# Patient Record
Sex: MALE | Race: WHITE | NOT HISPANIC OR LATINO | Employment: OTHER | URBAN - METROPOLITAN AREA
[De-identification: names, ages, dates, MRNs, and addresses within clinical notes are randomized per-mention and may not be internally consistent; named-entity substitution may affect disease eponyms.]

---

## 2018-01-12 ENCOUNTER — OFFICE VISIT (OUTPATIENT)
Dept: URGENT CARE | Facility: CLINIC | Age: 63
End: 2018-01-12
Payer: OTHER GOVERNMENT

## 2018-01-12 VITALS
DIASTOLIC BLOOD PRESSURE: 100 MMHG | WEIGHT: 205.38 LBS | HEIGHT: 70 IN | TEMPERATURE: 97 F | OXYGEN SATURATION: 99 % | HEART RATE: 69 BPM | BODY MASS INDEX: 29.4 KG/M2 | SYSTOLIC BLOOD PRESSURE: 147 MMHG

## 2018-01-12 DIAGNOSIS — R50.9 FEVER, UNSPECIFIED FEVER CAUSE: Primary | ICD-10-CM

## 2018-01-12 DIAGNOSIS — J40 BRONCHITIS: ICD-10-CM

## 2018-01-12 LAB
CTP QC/QA: YES
FLUAV AG NPH QL: NEGATIVE
FLUBV AG NPH QL: NEGATIVE

## 2018-01-12 PROCEDURE — 96372 THER/PROPH/DIAG INJ SC/IM: CPT | Mod: S$GLB,,, | Performed by: FAMILY MEDICINE

## 2018-01-12 PROCEDURE — 87804 INFLUENZA ASSAY W/OPTIC: CPT | Mod: 59,QW,S$GLB, | Performed by: FAMILY MEDICINE

## 2018-01-12 PROCEDURE — 99203 OFFICE O/P NEW LOW 30 MIN: CPT | Mod: 25,S$GLB,, | Performed by: FAMILY MEDICINE

## 2018-01-12 RX ORDER — AZITHROMYCIN 250 MG/1
TABLET, FILM COATED ORAL
Qty: 6 TABLET | Refills: 0 | Status: SHIPPED | OUTPATIENT
Start: 2018-01-12 | End: 2018-02-15

## 2018-01-12 RX ORDER — ALBUTEROL SULFATE 90 UG/1
2 AEROSOL, METERED RESPIRATORY (INHALATION) EVERY 6 HOURS PRN
Qty: 18 G | Refills: 0 | Status: SHIPPED | OUTPATIENT
Start: 2018-01-12 | End: 2018-02-15

## 2018-01-12 RX ORDER — BENZONATATE 100 MG/1
100 CAPSULE ORAL EVERY 6 HOURS PRN
Qty: 30 CAPSULE | Refills: 1 | Status: SHIPPED | OUTPATIENT
Start: 2018-01-12 | End: 2018-02-15

## 2018-01-12 RX ORDER — IBUPROFEN 200 MG
200 TABLET ORAL 2 TIMES DAILY PRN
COMMUNITY
Start: 2017-10-28

## 2018-01-12 RX ORDER — BETAMETHASONE SODIUM PHOSPHATE AND BETAMETHASONE ACETATE 3; 3 MG/ML; MG/ML
6 INJECTION, SUSPENSION INTRA-ARTICULAR; INTRALESIONAL; INTRAMUSCULAR; SOFT TISSUE ONCE
Status: COMPLETED | OUTPATIENT
Start: 2018-01-12 | End: 2018-01-12

## 2018-01-12 RX ORDER — CHOLECALCIFEROL (VITAMIN D3) 25 MCG
TABLET ORAL
COMMUNITY
Start: 2017-10-08 | End: 2018-02-15 | Stop reason: SDUPTHER

## 2018-01-12 RX ORDER — CLOPIDOGREL BISULFATE 75 MG/1
75 TABLET ORAL DAILY
COMMUNITY
Start: 2017-10-08 | End: 2018-02-15 | Stop reason: SDUPTHER

## 2018-01-12 RX ORDER — ATORVASTATIN CALCIUM 20 MG/1
20 TABLET, FILM COATED ORAL NIGHTLY
COMMUNITY
Start: 2017-10-08 | End: 2018-02-15 | Stop reason: SDUPTHER

## 2018-01-12 RX ADMIN — BETAMETHASONE SODIUM PHOSPHATE AND BETAMETHASONE ACETATE 6 MG: 3; 3 INJECTION, SUSPENSION INTRA-ARTICULAR; INTRALESIONAL; INTRAMUSCULAR; SOFT TISSUE at 04:01

## 2018-01-12 NOTE — PROGRESS NOTES
"Subjective:       Patient ID: Erik Rodriguez is a 63 y.o. male.    Vitals:  height is 5' 10" (1.778 m) and weight is 93.2 kg (205 lb 6.4 oz). His oral temperature is 97.1 °F (36.2 °C). His blood pressure is 147/100 (abnormal) and his pulse is 69. His oxygen saturation is 99%.     Chief Complaint: Cough    Cough   This is a recurrent problem. The current episode started in the past 7 days. Associated symptoms include chills, a fever, nasal congestion, postnasal drip and rhinorrhea. Pertinent negatives include no chest pain, ear pain, eye redness, headaches, myalgias, sore throat, shortness of breath or wheezing. There is no history of asthma.     Review of Systems   Constitution: Positive for chills, fever and night sweats. Negative for malaise/fatigue.   HENT: Positive for hoarse voice, postnasal drip and rhinorrhea. Negative for congestion, ear pain and sore throat.    Eyes: Negative for discharge and redness.   Cardiovascular: Negative for chest pain, dyspnea on exertion and leg swelling.   Respiratory: Positive for cough. Negative for shortness of breath, sputum production and wheezing.    Musculoskeletal: Negative for myalgias.   Gastrointestinal: Negative for abdominal pain, diarrhea, nausea and vomiting.   Neurological: Negative for headaches.       Objective:      Physical Exam   Constitutional: He is oriented to person, place, and time. He appears well-developed and well-nourished. He is cooperative.  Non-toxic appearance. He does not appear ill. No distress.   HENT:   Head: Normocephalic and atraumatic.   Right Ear: Hearing, tympanic membrane, external ear and ear canal normal.   Left Ear: Hearing, tympanic membrane, external ear and ear canal normal.   Nose: Nose normal. No mucosal edema, rhinorrhea or nasal deformity. No epistaxis. Right sinus exhibits no maxillary sinus tenderness and no frontal sinus tenderness. Left sinus exhibits no maxillary sinus tenderness and no frontal sinus tenderness. "   Mouth/Throat: Uvula is midline, oropharynx is clear and moist and mucous membranes are normal. No trismus in the jaw. Normal dentition. No uvula swelling. No posterior oropharyngeal erythema.   Eyes: Conjunctivae and lids are normal. No scleral icterus.   Sclera clear bilat   Neck: Trachea normal, full passive range of motion without pain and phonation normal. Neck supple.   Cardiovascular: Normal rate, regular rhythm, normal heart sounds, intact distal pulses and normal pulses.    Pulmonary/Chest: Breath sounds normal. He is in respiratory distress (cough).   Abdominal: Normal appearance. He exhibits no distension. There is no tenderness.   Musculoskeletal: Normal range of motion. He exhibits no edema or deformity.   Neurological: He is alert and oriented to person, place, and time. He exhibits normal muscle tone. Coordination normal.   Skin: Skin is warm, dry and intact. He is not diaphoretic. No pallor.   Psychiatric: He has a normal mood and affect. His speech is normal and behavior is normal. Judgment and thought content normal. Cognition and memory are normal.   Nursing note and vitals reviewed.      Assessment:       1. Fever, unspecified fever cause    2. Bronchitis        Plan:         Fever, unspecified fever cause  -     POCT Influenza A/B    Bronchitis    Other orders  -     azithromycin (ZITHROMAX) 250 MG tablet; Take 2 pills on day one, then one pill each day until completed  Dispense: 6 tablet; Refill: 0  -     betamethasone acetate-betamethasone sodium phosphate injection 6 mg; Inject 1 mL (6 mg total) into the muscle once.  -     benzonatate (TESSALON PERLES) 100 MG capsule; Take 1 capsule (100 mg total) by mouth every 6 (six) hours as needed for Cough.  Dispense: 30 capsule; Refill: 1  -     albuterol 90 mcg/actuation inhaler; Inhale 2 puffs into the lungs every 6 (six) hours as needed for Wheezing. Rescue  Dispense: 18 g; Refill: 0

## 2019-01-21 PROBLEM — R07.9 CHEST PAIN: Status: ACTIVE | Noted: 2019-01-21

## 2019-01-21 PROBLEM — M54.2 NECK PAIN: Status: ACTIVE | Noted: 2019-01-21

## 2019-01-22 ENCOUNTER — OFFICE VISIT (OUTPATIENT)
Dept: CARDIOLOGY | Facility: CLINIC | Age: 64
End: 2019-01-22
Payer: MEDICARE

## 2019-01-22 VITALS
HEIGHT: 70 IN | WEIGHT: 203.69 LBS | HEART RATE: 76 BPM | DIASTOLIC BLOOD PRESSURE: 78 MMHG | SYSTOLIC BLOOD PRESSURE: 134 MMHG | BODY MASS INDEX: 29.16 KG/M2

## 2019-01-22 DIAGNOSIS — R94.31 NONSPECIFIC ABNORMAL ELECTROCARDIOGRAM (ECG) (EKG): ICD-10-CM

## 2019-01-22 DIAGNOSIS — Z00.00 ROUTINE CHECK-UP: ICD-10-CM

## 2019-01-22 DIAGNOSIS — R42 VERTIGO: ICD-10-CM

## 2019-01-22 DIAGNOSIS — E78.5 HYPERLIPIDEMIA, UNSPECIFIED HYPERLIPIDEMIA TYPE: ICD-10-CM

## 2019-01-22 DIAGNOSIS — M54.2 NECK PAIN: ICD-10-CM

## 2019-01-22 DIAGNOSIS — G45.9 TIA (TRANSIENT ISCHEMIC ATTACK): ICD-10-CM

## 2019-01-22 DIAGNOSIS — R07.9 CHEST PAIN, UNSPECIFIED TYPE: ICD-10-CM

## 2019-01-22 PROCEDURE — 99999 PR PBB SHADOW E&M-EST. PATIENT-LVL III: CPT | Mod: PBBFAC,,, | Performed by: INTERNAL MEDICINE

## 2019-01-22 PROCEDURE — 99204 OFFICE O/P NEW MOD 45 MIN: CPT | Mod: S$PBB,,, | Performed by: INTERNAL MEDICINE

## 2019-01-22 PROCEDURE — 93010 ELECTROCARDIOGRAM REPORT: CPT | Mod: S$PBB,,, | Performed by: INTERNAL MEDICINE

## 2019-01-22 PROCEDURE — 99213 OFFICE O/P EST LOW 20 MIN: CPT | Mod: PBBFAC,PO,25 | Performed by: INTERNAL MEDICINE

## 2019-01-22 PROCEDURE — 93010 EKG 12-LEAD: ICD-10-PCS | Mod: S$PBB,,, | Performed by: INTERNAL MEDICINE

## 2019-01-22 PROCEDURE — 93005 ELECTROCARDIOGRAM TRACING: CPT | Mod: PBBFAC,PO | Performed by: INTERNAL MEDICINE

## 2019-01-22 PROCEDURE — 99204 PR OFFICE/OUTPT VISIT, NEW, LEVL IV, 45-59 MIN: ICD-10-PCS | Mod: S$PBB,,, | Performed by: INTERNAL MEDICINE

## 2019-01-22 PROCEDURE — 99999 PR PBB SHADOW E&M-EST. PATIENT-LVL III: ICD-10-PCS | Mod: PBBFAC,,, | Performed by: INTERNAL MEDICINE

## 2019-01-22 NOTE — LETTER
January 22, 2019      Win Jean II, MD  [address]  [facility] [address]           [facility]  [address] [facility] [address] [place] [address]  Phone: [phone]          Patient: Erik Rodriguez   MR Number: [id number]   YOB: 1955   Date of Visit: 1/22/2019       Dear Dr. Win Jean II:    Thank you for referring Erik Rodriguez to me for evaluation. Attached you will find relevant portions of my assessment and plan of care.    If you have questions, please do not hesitate to call me. I look forward to following Erik Rodriguez along with you.    Sincerely,    Joss Craft MD    Enclosure  CC:  No Recipients    If you would like to receive this communication electronically, please contact [email or web address] or [phone] to request more information on [facility] Link access.    For providers and/or their staff who would like to refer a patient to [facility], please contact us through our one-stop-shop provider referral line, [facility] , at [phone].    If you feel you have received this communication in error or would no longer like to receive these types of communications, please e-mail [email or web address]

## 2019-01-22 NOTE — PROGRESS NOTES
Subjective:    Patient ID:  Erik Rodriguez is a 64 y.o. male who presents for evaluation of Abnormal ECG (hx TIA); Dizziness; left shoulder pain; and Hyperlipidemia      Pt woke up one morning last week with severe vertigo which lasted about 6 hours. He also had L scapular and upper chest pain along with a pain in the arm with numbness and tingling into the hand. The shoulder and arm symptoms have persisted for the past several days and aggravated by certain positions. He has palin films of the cervical spine showing degenerative changes from C3-C7. ECG showed sinus rhythm with frequent PAC's but was read as AF. He denies any palpitations, syncope or pre-syncope. He denies rapid or sustained rhythms. He denies any exertional chest pain, SOB, PND, orthopnea. He denies claudication, lower extremity edema. He denies exertional symptoms.        Review of Systems   Constitution: Negative for weight gain and weight loss.   HENT: Negative.    Eyes: Negative.    Cardiovascular: Negative for claudication, cyanosis, dyspnea on exertion, irregular heartbeat, leg swelling, near-syncope, orthopnea (no PND), palpitations and syncope.   Respiratory: Negative for cough, hemoptysis, shortness of breath and snoring.    Endocrine: Negative.    Skin: Negative.    Musculoskeletal: Positive for neck pain. Negative for joint pain, muscle cramps, muscle weakness and myalgias.   Gastrointestinal: Negative for diarrhea, hematemesis, nausea and vomiting.   Genitourinary: Negative.    Neurological: Positive for dizziness and vertigo. Negative for focal weakness, light-headedness, loss of balance, numbness, paresthesias and seizures.   Psychiatric/Behavioral: Negative.         Objective:    Physical Exam   Constitutional: He is oriented to person, place, and time. He appears well-developed and well-nourished.   HENT:   Mouth/Throat: Oropharynx is clear and moist.   Eyes: Pupils are equal, round, and reactive to light.   Neck: Normal range of  motion. No thyromegaly present.   Cardiovascular: Normal rate, regular rhythm, S1 normal, S2 normal, normal heart sounds, intact distal pulses and normal pulses.  Occasional extrasystoles are present. PMI is not displaced. Exam reveals no friction rub.   No murmur heard.  Pulmonary/Chest: Effort normal and breath sounds normal. He has no wheezes. He has no rales. He exhibits no tenderness.   Abdominal: Soft. Bowel sounds are normal. He exhibits no distension and no mass. There is no tenderness.   Musculoskeletal: Normal range of motion. He exhibits no edema.   Neurological: He is alert and oriented to person, place, and time.   Skin: Skin is warm and dry.   Vitals reviewed.      Test(s) Reviewed  I have reviewed the following in detail:  [] Stress test   [] Angiography   [] Echocardiogram   [] Labs   [x] Other:  ECG; neck X-ray       Assessment:       1. Chest pain, unspecified type    2. Nonspecific abnormal electrocardiogram (ECG) (EKG)    3. Neck pain    4. Hyperlipidemia, unspecified hyperlipidemia type    5. Vertigo    6. h/o TIA (transient ischemic attack)         Plan:       Pt's ECG from 1/21 shows frequent PAC's not atrial fibrillation.  His neck and arm symptoms are most likely due to his arthritic issues in the cervical spine and unlikely cardiac.  Given age and other risk factors it is reasonable to do screening stress and Echo  SPECT stress  Echo

## 2019-01-31 ENCOUNTER — CLINICAL SUPPORT (OUTPATIENT)
Dept: CARDIOLOGY | Facility: CLINIC | Age: 64
End: 2019-01-31
Attending: INTERNAL MEDICINE
Payer: MEDICARE

## 2019-01-31 ENCOUNTER — HOSPITAL ENCOUNTER (OUTPATIENT)
Dept: RADIOLOGY | Facility: HOSPITAL | Age: 64
Discharge: HOME | End: 2019-01-31
Attending: INTERNAL MEDICINE
Payer: MEDICARE

## 2019-01-31 VITALS
BODY MASS INDEX: 29.06 KG/M2 | DIASTOLIC BLOOD PRESSURE: 76 MMHG | HEART RATE: 68 BPM | HEIGHT: 70 IN | SYSTOLIC BLOOD PRESSURE: 118 MMHG | WEIGHT: 203 LBS

## 2019-01-31 DIAGNOSIS — G45.9 TIA (TRANSIENT ISCHEMIC ATTACK): ICD-10-CM

## 2019-01-31 DIAGNOSIS — R42 VERTIGO: ICD-10-CM

## 2019-01-31 DIAGNOSIS — M54.2 NECK PAIN: ICD-10-CM

## 2019-01-31 DIAGNOSIS — R94.31 NONSPECIFIC ABNORMAL ELECTROCARDIOGRAM (ECG) (EKG): ICD-10-CM

## 2019-01-31 DIAGNOSIS — E78.5 HYPERLIPIDEMIA, UNSPECIFIED HYPERLIPIDEMIA TYPE: ICD-10-CM

## 2019-01-31 DIAGNOSIS — R07.9 CHEST PAIN, UNSPECIFIED TYPE: ICD-10-CM

## 2019-01-31 DIAGNOSIS — Z00.00 ROUTINE CHECK-UP: Primary | ICD-10-CM

## 2019-01-31 LAB
ASCENDING AORTA: 2.92 CM
AV INDEX (PROSTH): 0.58
AV MEAN GRADIENT: 4.4 MMHG
AV PEAK GRADIENT: 7.95 MMHG
AV VALVE AREA: 2.02 CM2
AV VELOCITY RATIO: 0.61
BSA FOR ECHO PROCEDURE: 2.13 M2
CV ECHO LV RWT: 0.34 CM
CV STRESS BASE HR: 70
DIASTOLIC BLOOD PRESSURE: 93
DOP CALC AO PEAK VEL: 1.41 M/S
DOP CALC AO VTI: 34.55 CM
DOP CALC LVOT AREA: 3.49 CM2
DOP CALC LVOT DIAMETER: 2.11 CM
DOP CALC LVOT PEAK VEL: 0.87 M/S
DOP CALC LVOT STROKE VOLUME: 69.72 CM3
DOP CALCLVOT PEAK VEL VTI: 19.95 CM
E WAVE DECELERATION TIME: 199.8 MSEC
E/A RATIO: 1.01
E/E' RATIO: 8.33
ECHO LV POSTERIOR WALL: 0.85 CM (ref 0.6–1.1)
FRACTIONAL SHORTENING: 39 % (ref 28–44)
INTERVENTRICULAR SEPTUM: 0.88 CM (ref 0.6–1.1)
IVRT: 0.12 MSEC
LA MAJOR: 5.22 CM
LA MINOR: 5.2 CM
LA WIDTH: 4.04 CM
LEFT ATRIUM SIZE: 3.48 CM
LEFT ATRIUM VOLUME INDEX: 29.6 ML/M2
LEFT ATRIUM VOLUME: 62.26 CM3
LEFT INTERNAL DIMENSION IN SYSTOLE: 3.02 CM (ref 2.1–4)
LEFT VENTRICLE DIASTOLIC VOLUME INDEX: 54.52 ML/M2
LEFT VENTRICLE DIASTOLIC VOLUME: 114.52 ML
LEFT VENTRICLE MASS INDEX: 69.8 G/M2
LEFT VENTRICLE SYSTOLIC VOLUME INDEX: 16.9 ML/M2
LEFT VENTRICLE SYSTOLIC VOLUME: 35.58 ML
LEFT VENTRICULAR INTERNAL DIMENSION IN DIASTOLE: 4.93 CM (ref 3.5–6)
LEFT VENTRICULAR MASS: 146.69 G
LV LATERAL E/E' RATIO: 6.25
LV SEPTAL E/E' RATIO: 12.5
MV PEAK A VEL: 0.74 M/S
MV PEAK E VEL: 0.75 M/S
NUC REST EJECTION FRACTION: 64
OHS CV CPX 1 MINUTE RECOVERY HEART RATE: 110 BPM
OHS CV CPX 85 PERCENT MAX PREDICTED HEART RATE MALE: 132
OHS CV CPX ESTIMATED METS: 11
OHS CV CPX MAX PREDICTED HEART RATE: 155
OHS CV CPX PATIENT IS FEMALE: 0
OHS CV CPX PATIENT IS MALE: 1
OHS CV CPX PEAK DIASTOLIC BLOOD PRESSURE: 101 MMHG
OHS CV CPX PEAK HEAR RATE: 148
OHS CV CPX PEAK RATE PRESSURE PRODUCT: NORMAL
OHS CV CPX PEAK SYSTOLIC BLOOD PRESSURE: 155
OHS CV CPX PERCENT MAX PREDICTED HEART RATE ACHIEVED: 95
OHS CV CPX RATE PRESSURE PRODUCT PRESENTING: 9170
PISA TR MAX VEL: 2.27 M/S
PULM VEIN S/D RATIO: 1.42
PV PEAK D VEL: 0.53 M/S
PV PEAK S VEL: 0.75 M/S
RA MAJOR: 4.87 CM
RA PRESSURE: 3 MMHG
RA WIDTH: 3.84 CM
RIGHT VENTRICULAR END-DIASTOLIC DIMENSION: 3.9 CM
SINUS: 3.17 CM
STJ: 2.7 CM
STRESS ECHO POST EXERCISE DUR MIN: 6 MIN
STRESS ECHO POST EXERCISE DUR SEC: 49
SYSTOLIC BLOOD PRESSURE: 131
TDI LATERAL: 0.12
TDI SEPTAL: 0.06
TDI: 0.09
TR MAX PG: 20.61 MMHG
TRICUSPID ANNULAR PLANE SYSTOLIC EXCURSION: 2.6 CM
TV REST PULMONARY ARTERY PRESSURE: 24 MMHG

## 2019-01-31 PROCEDURE — 93306 TRANSTHORACIC ECHO (TTE) COMPLETE (CUPID ONLY): ICD-10-PCS | Mod: 26,S$PBB,, | Performed by: INTERNAL MEDICINE

## 2019-01-31 PROCEDURE — 93016 STRESS TEST WITH MYOCARDIAL PERFUSION (CUPID ONLY): ICD-10-PCS | Mod: ,,, | Performed by: INTERNAL MEDICINE

## 2019-01-31 PROCEDURE — 99999 PR PBB SHADOW E&M-EST. PATIENT-LVL II: ICD-10-PCS | Mod: PBBFAC,,,

## 2019-01-31 PROCEDURE — A9502 TC99M TETROFOSMIN: HCPCS | Mod: PO

## 2019-01-31 PROCEDURE — 93016 CV STRESS TEST SUPVJ ONLY: CPT | Mod: ,,, | Performed by: INTERNAL MEDICINE

## 2019-01-31 PROCEDURE — 93306 TTE W/DOPPLER COMPLETE: CPT | Mod: PBBFAC,PO | Performed by: INTERNAL MEDICINE

## 2019-01-31 PROCEDURE — 78452 STRESS TEST WITH MYOCARDIAL PERFUSION (CUPID ONLY): ICD-10-PCS | Mod: 26,,, | Performed by: INTERNAL MEDICINE

## 2019-01-31 PROCEDURE — 93018 CV STRESS TEST I&R ONLY: CPT | Mod: ,,, | Performed by: INTERNAL MEDICINE

## 2019-01-31 PROCEDURE — 99999 PR PBB SHADOW E&M-EST. PATIENT-LVL II: CPT | Mod: PBBFAC,,,

## 2019-01-31 PROCEDURE — 93018 PR CARDIAC STRESS TST,INTERP/REPT ONLY: ICD-10-PCS | Mod: ,,, | Performed by: INTERNAL MEDICINE

## 2019-01-31 PROCEDURE — 78452 HT MUSCLE IMAGE SPECT MULT: CPT | Mod: 26,,, | Performed by: INTERNAL MEDICINE

## 2019-01-31 PROCEDURE — 99212 OFFICE O/P EST SF 10 MIN: CPT | Mod: PBBFAC,25,PO

## 2019-02-04 ENCOUNTER — TELEPHONE (OUTPATIENT)
Dept: CARDIOLOGY | Facility: CLINIC | Age: 64
End: 2019-02-04

## 2019-02-05 ENCOUNTER — PATIENT MESSAGE (OUTPATIENT)
Dept: ADMINISTRATIVE | Facility: OTHER | Age: 64
End: 2019-02-05

## 2019-02-05 ENCOUNTER — TELEPHONE (OUTPATIENT)
Dept: CARDIOLOGY | Facility: CLINIC | Age: 64
End: 2019-02-05

## 2019-02-05 NOTE — TELEPHONE ENCOUNTER
----- Message from Rochelle Mack sent at 2/5/2019  4:50 PM CST -----   Type:  Test Results    Who Called:  pt  Name of Test (Lab/Mammo/Etc): stress  test  Date of Test:    0131   Where the test was performed:  [facility]  Best Call Back Number:[phone]  Additional Information:  placed a call to pod //  Pt  Has  Some  Questions  About     Stress  Test  Results

## 2019-02-18 PROBLEM — M50.90 CERVICAL DISC DISEASE: Status: ACTIVE | Noted: 2019-02-18

## 2019-04-25 ENCOUNTER — OFFICE VISIT (OUTPATIENT)
Dept: URGENT CARE | Facility: CLINIC | Age: 64
End: 2019-04-25
Payer: MEDICARE

## 2019-04-25 VITALS
HEART RATE: 73 BPM | DIASTOLIC BLOOD PRESSURE: 82 MMHG | WEIGHT: 199 LBS | BODY MASS INDEX: 28.49 KG/M2 | TEMPERATURE: 98 F | HEIGHT: 70 IN | RESPIRATION RATE: 12 BRPM | OXYGEN SATURATION: 98 % | SYSTOLIC BLOOD PRESSURE: 127 MMHG

## 2019-04-25 DIAGNOSIS — R09.81 SINUS CONGESTION: Primary | ICD-10-CM

## 2019-04-25 PROCEDURE — 99214 PR OFFICE/OUTPT VISIT, EST, LEVL IV, 30-39 MIN: ICD-10-PCS | Mod: S$GLB,,, | Performed by: PHYSICIAN ASSISTANT

## 2019-04-25 PROCEDURE — 99214 OFFICE O/P EST MOD 30 MIN: CPT | Mod: S$GLB,,, | Performed by: PHYSICIAN ASSISTANT

## 2019-04-25 RX ORDER — OLOPATADINE HYDROCHLORIDE 1 MG/ML
1 SOLUTION/ DROPS OPHTHALMIC 2 TIMES DAILY
Qty: 5 ML | Refills: 2 | Status: SHIPPED | OUTPATIENT
Start: 2019-04-25 | End: 2019-08-14 | Stop reason: SDUPTHER

## 2019-04-25 RX ORDER — METHYLPREDNISOLONE 4 MG/1
TABLET ORAL
Qty: 1 PACKAGE | Refills: 0 | Status: SHIPPED | OUTPATIENT
Start: 2019-04-25 | End: 2019-08-14 | Stop reason: ALTCHOICE

## 2019-04-25 RX ORDER — FLUTICASONE PROPIONATE 50 MCG
1 SPRAY, SUSPENSION (ML) NASAL 2 TIMES DAILY PRN
Qty: 1 BOTTLE | Refills: 1 | Status: SHIPPED | OUTPATIENT
Start: 2019-04-25 | End: 2020-03-02 | Stop reason: SDUPTHER

## 2019-04-25 NOTE — PROGRESS NOTES
"Subjective:       Patient ID: Erik Rodriguez is a 64 y.o. male.    Vitals:  height is 5' 10" (1.778 m) and weight is 90.3 kg (199 lb). His oral temperature is 97.7 °F (36.5 °C). His blood pressure is 127/82 and his pulse is 73. His respiration is 12 and oxygen saturation is 98%.     Chief Complaint: Sinus Problem    Pt c/o headaces, sinus pressure, itchy eyes, watery eyes, and nasal congestion, x 15 days     Sinus Problem   This is a new problem. The current episode started 1 to 4 weeks ago. The problem has been gradually worsening since onset. There has been no fever. His pain is at a severity of 3/10. The pain is mild. Associated symptoms include congestion (nasal congestion ), headaches and sinus pressure. Pertinent negatives include no chills, diaphoresis, ear pain, shortness of breath or sore throat. Past treatments include saline sprays.       Constitution: Negative for chills, sweating, fatigue and fever.   HENT: Positive for congestion (nasal congestion ) and sinus pressure. Negative for ear pain, sinus pain, sore throat and voice change.    Neck: Negative for painful lymph nodes.   Eyes: Positive for eye itching and eye redness.   Respiratory: Negative for chest tightness, sputum production, bloody sputum, COPD, shortness of breath, stridor, wheezing and asthma.    Gastrointestinal: Negative for nausea and vomiting.   Musculoskeletal: Negative for muscle ache.   Skin: Negative for rash.   Allergic/Immunologic: Negative for seasonal allergies and asthma.   Neurological: Positive for headaches.   Hematologic/Lymphatic: Negative for swollen lymph nodes.       Objective:      Physical Exam   Constitutional: He is oriented to person, place, and time. He appears well-developed and well-nourished. He is cooperative.  Non-toxic appearance. He does not appear ill. No distress.   HENT:   Head: Normocephalic and atraumatic.   Right Ear: Hearing, tympanic membrane, external ear and ear canal normal.   Left Ear: Hearing, " tympanic membrane, external ear and ear canal normal.   Nose: Nose normal. No mucosal edema, rhinorrhea or nasal deformity. No epistaxis. Right sinus exhibits no maxillary sinus tenderness and no frontal sinus tenderness. Left sinus exhibits no maxillary sinus tenderness and no frontal sinus tenderness.   Mouth/Throat: Uvula is midline, oropharynx is clear and moist and mucous membranes are normal. No trismus in the jaw. Normal dentition. No uvula swelling. No posterior oropharyngeal erythema.   Eyes: Conjunctivae and lids are normal. No scleral icterus.   Sclera clear bilat   Neck: Trachea normal, full passive range of motion without pain and phonation normal. Neck supple.   Cardiovascular: Normal rate, regular rhythm, normal heart sounds, intact distal pulses and normal pulses.   Pulmonary/Chest: Effort normal and breath sounds normal. No respiratory distress.   Abdominal: Soft. Normal appearance and bowel sounds are normal. He exhibits no distension. There is no tenderness.   Musculoskeletal: Normal range of motion. He exhibits no edema or deformity.   Neurological: He is alert and oriented to person, place, and time. He exhibits normal muscle tone. Coordination normal.   Skin: Skin is warm, dry and intact. He is not diaphoretic. No pallor.   Psychiatric: He has a normal mood and affect. His speech is normal and behavior is normal. Judgment and thought content normal. Cognition and memory are normal.   Nursing note and vitals reviewed.      Assessment:       1. Sinus congestion        Plan:         Sinus congestion  -     olopatadine (PATANOL) 0.1 % ophthalmic solution; Place 1 drop into both eyes 2 (two) times daily.  Dispense: 5 mL; Refill: 2  -     methylPREDNISolone (MEDROL DOSEPACK) 4 mg tablet; use as directed on the box  Dispense: 1 Package; Refill: 0  -     fluticasone (FLONASE) 50 mcg/actuation nasal spray; 1 spray (50 mcg total) by Each Nare route 2 (two) times daily as needed for Rhinitis or Allergies.  " Dispense: 1 Bottle; Refill: 1  -     sodium chloride (OCEAN NASAL) 0.65 % nasal spray; 1 spray by Nasal route as needed for Congestion.  Dispense: 45 mL; Refill: 3    RTC if symptoms persist or worsen.    Patient Instructions   NASAL ALLERGY    Nasal Allergy, also called "Allergic Rhinitis" occurs after exposure to pollen, molds, mildew, animal "dander" (scales from animal skin, hair and feathers), dust, smoke and fumes. (These are called "allergens"). When pollen causes a nasal allergy it is commonly called "Hay Fever".    When these particles contact the lining of the nose, eyes, eyelids, sinuses or throat, they cause the cells to release a chemical called "histamine". Histamine may cause a watery discharge from the eyes or nose. It may also cause violent sneezing, nasal congestion, itching of the eyes, nose, throat and mouth.    PREVENTION:    Nasal allergy cannot be cured but symptoms can be reduced. Avoid or reduce exposure to the allergen when possible.    HOME CARE:    1) DECONGESTANT pills and sprays (Sudafed, NeoSynephrine), reduce tissue swelling and watery discharge. Overuse of nasal decongestant sprays may make symptoms worse, ESPECIALLY IF YOU HAVE HIGH BLOOD PRESSURE. Do not use these more often than recommended. Get an over the counter Nasal Saline spray to supplement Flonase    2) ANTIHISTAMINES block the release of histamine during the allergic response. Antihistamines are more effective when taken BEFORE symptoms develop. Unless a prescription antihistamine was prescribed, you may take CLARITIN (loratadine). (Claritin is an over-the-counter antihistamine that does not cause drowsiness.)    3) STEROID nasal sprays (Beconase, Vancenase, Nasalide, Nasocort, Flonase) or oral steroids (Prednisone) may also be prescribed for more severe symptoms. These help to reduce the local inflammation which adds to the allergic response.    4) If you have ASTHMA, pollen season may make your asthma symptoms worse. " It is important that you use your asthma medicines as directed during this time to prevent or treat attacks. Some persons with asthma have a worsening of their asthma symptoms when taking antihistamines. If you notice this, stop the antihistamines and notify your doctor.    5) Consider a Chitto Rhino Nasal and Sinus Rinse 2-3 times/week if your symptoms are chronic. (https://chitorhino.com)       If not allergic,take tylenol (acetominophen) for fever control, chills, or body aches every 4 hours. Do not exceed 4000 mg/ day.If not allergic, take Motrin (Ibuprofen) every 4 hours for fever, chills, pain or inflammation. Do not exceed 2400 mg/day. You can alternate taking tylenol and motrin.  If you were prescribed a narcotic medication, do not drive or operate heavy equipment or machinery while taking these medications.  You must understand that you've received an Urgent Care treatment only and that you may be released before all your medical problems are known or treated. You, the patient, will arrange for follow up care as instructed.  Follow up with your PCP or specialty clinic as directed in the next 1-2 weeks if not improved or as needed.  You can call [phone] to schedule an appointment with the appropriate provider.  If your condition worsens we recommend that you receive another evaluation at the emergency room immediately or contact your primary medical clinics after hours call service to discuss your concerns.  Please return here or go to the Emergency Department for any concerns or worsening of condition.

## 2019-04-25 NOTE — PATIENT INSTRUCTIONS
"NASAL ALLERGY    Nasal Allergy, also called "Allergic Rhinitis" occurs after exposure to pollen, molds, mildew, animal "dander" (scales from animal skin, hair and feathers), dust, smoke and fumes. (These are called "allergens"). When pollen causes a nasal allergy it is commonly called "Hay Fever".    When these particles contact the lining of the nose, eyes, eyelids, sinuses or throat, they cause the cells to release a chemical called "histamine". Histamine may cause a watery discharge from the eyes or nose. It may also cause violent sneezing, nasal congestion, itching of the eyes, nose, throat and mouth.    PREVENTION:    Nasal allergy cannot be cured but symptoms can be reduced. Avoid or reduce exposure to the allergen when possible.    HOME CARE:    1) DECONGESTANT pills and sprays (Sudafed, NeoSynephrine), reduce tissue swelling and watery discharge. Overuse of nasal decongestant sprays may make symptoms worse, ESPECIALLY IF YOU HAVE HIGH BLOOD PRESSURE. Do not use these more often than recommended. Get an over the counter Nasal Saline spray to supplement Flonase    2) ANTIHISTAMINES block the release of histamine during the allergic response. Antihistamines are more effective when taken BEFORE symptoms develop. Unless a prescription antihistamine was prescribed, you may take CLARITIN (loratadine). (Claritin is an over-the-counter antihistamine that does not cause drowsiness.)    3) STEROID nasal sprays (Beconase, Vancenase, Nasalide, Nasocort, Flonase) or oral steroids (Prednisone) may also be prescribed for more severe symptoms. These help to reduce the local inflammation which adds to the allergic response.    4) If you have ASTHMA, pollen season may make your asthma symptoms worse. It is important that you use your asthma medicines as directed during this time to prevent or treat attacks. Some persons with asthma have a worsening of their asthma symptoms when taking antihistamines. If you notice this, stop " the antihistamines and notify your doctor.    5) Consider a Chitto Rhino Nasal and Sinus Rinse 2-3 times/week if your symptoms are chronic. ([email or web address]://chitorhino.[email or web address])       If not allergic,take tylenol (acetominophen) for fever control, chills, or body aches every 4 hours. Do not exceed 4000 mg/ day.If not allergic, take Motrin (Ibuprofen) every 4 hours for fever, chills, pain or inflammation. Do not exceed 2400 mg/day. You can alternate taking tylenol and motrin.  If you were prescribed a narcotic medication, do not drive or operate heavy equipment or machinery while taking these medications.  You must understand that you've received an Urgent Care treatment only and that you may be released before all your medical problems are known or treated. You, the patient, will arrange for follow up care as instructed.  Follow up with your PCP or specialty clinic as directed in the next 1-2 weeks if not improved or as needed.  You can call [phone] to schedule an appointment with the appropriate provider.  If your condition worsens we recommend that you receive another evaluation at the emergency room immediately or contact your primary medical clinics after hours call service to discuss your concerns.  Please return here or go to the Emergency Department for any concerns or worsening of condition.

## 2019-08-14 PROBLEM — H10.13 ALLERGIC CONJUNCTIVITIS OF BOTH EYES: Status: ACTIVE | Noted: 2019-08-14

## 2019-08-14 PROBLEM — R09.81 SINUS CONGESTION: Status: ACTIVE | Noted: 2019-08-14

## 2019-08-14 PROBLEM — Z23 NEED FOR 23-POLYVALENT PNEUMOCOCCAL POLYSACCHARIDE VACCINE: Status: RESOLVED | Noted: 2019-08-14 | Resolved: 2019-08-14

## 2019-08-14 PROBLEM — Z23 NEED FOR 23-POLYVALENT PNEUMOCOCCAL POLYSACCHARIDE VACCINE: Status: ACTIVE | Noted: 2019-08-14

## 2019-08-14 PROBLEM — J30.1 SEASONAL ALLERGIC RHINITIS DUE TO POLLEN: Status: ACTIVE | Noted: 2019-08-14

## 2019-09-16 ENCOUNTER — OFFICE VISIT (OUTPATIENT)
Dept: DERMATOLOGY | Facility: CLINIC | Age: 64
End: 2019-09-16
Payer: MEDICARE

## 2019-09-16 VITALS — WEIGHT: 194.44 LBS | HEIGHT: 70 IN | BODY MASS INDEX: 27.84 KG/M2 | RESPIRATION RATE: 18 BRPM

## 2019-09-16 DIAGNOSIS — L82.0 INFLAMED SEBORRHEIC KERATOSIS: Primary | ICD-10-CM

## 2019-09-16 DIAGNOSIS — L98.9 SKIN LESION: ICD-10-CM

## 2019-09-16 PROCEDURE — 99999 PR PBB SHADOW E&M-EST. PATIENT-LVL II: ICD-10-PCS | Mod: PBBFAC,,, | Performed by: DERMATOLOGY

## 2019-09-16 PROCEDURE — 17110 PR DESTRUCTION BENIGN LESIONS UP TO 14: ICD-10-PCS | Mod: S$PBB,,, | Performed by: DERMATOLOGY

## 2019-09-16 PROCEDURE — 99212 OFFICE O/P EST SF 10 MIN: CPT | Mod: PBBFAC,PO,25 | Performed by: DERMATOLOGY

## 2019-09-16 PROCEDURE — 99202 OFFICE O/P NEW SF 15 MIN: CPT | Mod: 25,S$PBB,, | Performed by: DERMATOLOGY

## 2019-09-16 PROCEDURE — 99999 PR PBB SHADOW E&M-EST. PATIENT-LVL II: CPT | Mod: PBBFAC,,, | Performed by: DERMATOLOGY

## 2019-09-16 PROCEDURE — 99202 PR OFFICE/OUTPT VISIT, NEW, LEVL II, 15-29 MIN: ICD-10-PCS | Mod: 25,S$PBB,, | Performed by: DERMATOLOGY

## 2019-09-16 PROCEDURE — 17110 DESTRUCTION B9 LES UP TO 14: CPT | Mod: PBBFAC,PO | Performed by: DERMATOLOGY

## 2019-09-16 PROCEDURE — 17110 DESTRUCTION B9 LES UP TO 14: CPT | Mod: S$PBB,,, | Performed by: DERMATOLOGY

## 2019-09-16 NOTE — PROGRESS NOTES
Subjective:       Patient ID:  Erik Rodriguez is a 64 y.o. male who presents for   Chief Complaint   Patient presents with    Lesion    Skin Discoloration     Present for initial visit.  C/o lesion to R cheek x 6 years. The patient denies any change, including change in color, increase in size, or spontaneous bleeding, associated with this lesion.  Not currently treating- states he has applied baby lotion in past with no improvement    Red discoloration to nasal tip- worse with sun exposure    No phx of NMSC  No fhx of melanoma    Past Medical History:  No date: Allergy  No date: Hyperlipidemia  No date: Stroke  07/2010: TIA (transient ischemic attack)        Review of Systems   Constitutional: Negative for fever and chills.   HENT: Negative for sore throat.    Respiratory: Negative for cough.    Gastrointestinal: Negative for nausea.   Skin: Positive for activity-related sunscreen use and wears hat. Negative for itching, rash, dry skin and daily sunscreen use.   Hematologic/Lymphatic: Does not bruise/bleed easily.        Objective:    Physical Exam   Constitutional: He appears well-developed and well-nourished. No distress.   Neurological: He is alert and oriented to person, place, and time. He is not disoriented.   Psychiatric: He has a normal mood and affect.   Skin:   Areas Examined (abnormalities noted in diagram):   Scalp / Hair Palpated and Inspected  Head / Face Inspection Performed  Neck Inspection Performed              Diagram Legend     Erythematous scaling macule/papule c/w actinic keratosis       Vascular papule c/w angioma      Pigmented verrucoid papule/plaque c/w seborrheic keratosis      Yellow umbilicated papule c/w sebaceous hyperplasia      Irregularly shaped tan macule c/w lentigo     1-2 mm smooth white papules consistent with Milia      Movable subcutaneous cyst with punctum c/w epidermal inclusion cyst      Subcutaneous movable cyst c/w pilar cyst      Firm pink to brown papule c/w  dermatofibroma      Pedunculated fleshy papule(s) c/w skin tag(s)      Evenly pigmented macule c/w junctional nevus     Mildly variegated pigmented, slightly irregular-bordered macule c/w mildly atypical nevus      Flesh colored to evenly pigmented papule c/w intradermal nevus       Pink pearly papule/plaque c/w basal cell carcinoma      Erythematous hyperkeratotic cursted plaque c/w SCC      Surgical scar with no sign of skin cancer recurrence      Open and closed comedones      Inflammatory papules and pustules      Verrucoid papule consistent consistent with wart     Erythematous eczematous patches and plaques     Dystrophic onycholytic nail with subungual debris c/w onychomycosis     Umbilicated papule    Erythematous-base heme-crusted tan verrucoid plaque consistent with inflamed seborrheic keratosis     Erythematous Silvery Scaling Plaque c/w Psoriasis     See annotation      Assessment / Plan:        Inflamed seborrheic keratosis  Procedure note for destruction via hyfrecation and curettage:    Verbal consent obtained. Risks of recurrence and scarring discussed.   1 lesions cleaned with alcohol and anesthetized with 1% lidocaine with epinephrine. Areas then lightly hyfrecated and curetted to remove gross lesion. Hemostasis achieved with aluminum chloride application. No complications.   Areas dressed with Vaseline jelly and bandage. Wound care discussed.    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              Follow up for skin check, at pts convenience.

## 2020-02-20 ENCOUNTER — OFFICE VISIT (OUTPATIENT)
Dept: URGENT CARE | Facility: CLINIC | Age: 65
End: 2020-02-20
Payer: MEDICARE

## 2020-02-20 VITALS
HEART RATE: 73 BPM | BODY MASS INDEX: 27.77 KG/M2 | RESPIRATION RATE: 16 BRPM | WEIGHT: 194 LBS | SYSTOLIC BLOOD PRESSURE: 125 MMHG | OXYGEN SATURATION: 98 % | TEMPERATURE: 101 F | DIASTOLIC BLOOD PRESSURE: 75 MMHG | HEIGHT: 70 IN

## 2020-02-20 DIAGNOSIS — J01.90 ACUTE BACTERIAL SINUSITIS: Primary | ICD-10-CM

## 2020-02-20 DIAGNOSIS — R50.9 FEVER, UNSPECIFIED FEVER CAUSE: ICD-10-CM

## 2020-02-20 DIAGNOSIS — B96.89 ACUTE BACTERIAL SINUSITIS: Primary | ICD-10-CM

## 2020-02-20 LAB
CTP QC/QA: YES
FLUAV AG NPH QL: NEGATIVE
FLUBV AG NPH QL: NEGATIVE

## 2020-02-20 PROCEDURE — 99214 PR OFFICE/OUTPT VISIT, EST, LEVL IV, 30-39 MIN: ICD-10-PCS | Mod: 25,S$GLB,, | Performed by: EMERGENCY MEDICINE

## 2020-02-20 PROCEDURE — 99214 OFFICE O/P EST MOD 30 MIN: CPT | Mod: 25,S$GLB,, | Performed by: EMERGENCY MEDICINE

## 2020-02-20 PROCEDURE — 87804 INFLUENZA ASSAY W/OPTIC: CPT | Mod: QW,S$GLB,, | Performed by: EMERGENCY MEDICINE

## 2020-02-20 PROCEDURE — 96372 THER/PROPH/DIAG INJ SC/IM: CPT | Mod: S$GLB,,, | Performed by: EMERGENCY MEDICINE

## 2020-02-20 PROCEDURE — 87804 POCT INFLUENZA A/B: ICD-10-PCS | Mod: QW,S$GLB,, | Performed by: EMERGENCY MEDICINE

## 2020-02-20 PROCEDURE — 96372 PR INJECTION,THERAP/PROPH/DIAG2ST, IM OR SUBCUT: ICD-10-PCS | Mod: S$GLB,,, | Performed by: EMERGENCY MEDICINE

## 2020-02-20 RX ORDER — BETAMETHASONE SODIUM PHOSPHATE AND BETAMETHASONE ACETATE 3; 3 MG/ML; MG/ML
9 INJECTION, SUSPENSION INTRA-ARTICULAR; INTRALESIONAL; INTRAMUSCULAR; SOFT TISSUE
Status: COMPLETED | OUTPATIENT
Start: 2020-02-20 | End: 2020-02-20

## 2020-02-20 RX ORDER — IBUPROFEN 200 MG
600 TABLET ORAL
Status: COMPLETED | OUTPATIENT
Start: 2020-02-20 | End: 2020-02-20

## 2020-02-20 RX ORDER — AMOXICILLIN AND CLAVULANATE POTASSIUM 875; 125 MG/1; MG/1
1 TABLET, FILM COATED ORAL EVERY 12 HOURS
Qty: 20 TABLET | Refills: 0 | Status: SHIPPED | OUTPATIENT
Start: 2020-02-20 | End: 2020-03-02 | Stop reason: ALTCHOICE

## 2020-02-20 RX ADMIN — BETAMETHASONE SODIUM PHOSPHATE AND BETAMETHASONE ACETATE 9 MG: 3; 3 INJECTION, SUSPENSION INTRA-ARTICULAR; INTRALESIONAL; INTRAMUSCULAR; SOFT TISSUE at 11:02

## 2020-02-20 RX ADMIN — Medication 600 MG: at 11:02

## 2020-02-20 NOTE — PATIENT INSTRUCTIONS
Rest and hydrate with plenty of fluids  Flu swab negative  Celestone shot 1.5 cc given in clinic  Augmentin prescription  Over-the-counter nasal spray like saline or Flonase  Over-the-counter Zyrtec or Claritin or Allegra  Over-the-counter Mucinex DM twice daily for cough  Ibuprofen 600 mg every 6 hr for fever or body aches or headache  Review sinusitis sheet      Acute Bacterial Rhinosinusitis (ABRS)    Acute bacterial rhinosinusitis (ABRS) is an infection of your nasal cavity and sinuses. Its caused by bacteria. Acute means that youve had symptoms for less than 12 weeks.  Understanding your sinuses  The nasal cavity is the large air-filled space behind your nose. The sinuses are a group of spaces formed by the bones of your face. They connect with your nasal cavity. ABRS causes the tissue lining these spaces to become inflamed. Mucus may not drain normally. This leads to facial pain and other symptoms.  What causes ABRS?  ABRS most often follows an upper respiratory infection caused by a virus. Bacteria then infect the lining of your nasal cavity and sinuses. But you can also get ABRS if you have:  · Nasal allergies  · Long-term nasal swelling and congestion not caused by allergies  · Blockage in the nose  Symptoms of ABRS  The symptoms of ABRS may be different for each person, and can include:  · Nasal congestion  · Runny nose  · Fluid draining from the nose down the throat (postnasal drip)  · Headache  · Cough  · Pain in the sinuses  · Thick, colored fluid from the nose (mucus)  · Fever  Diagnosing ABRS  ABRS may be diagnosed if youve had an upper respiratory infection like a cold and cough for longer than 10 to 14 days. Your health care provider will ask about your symptoms and your medical history. The provider will check your vital signs, including your temperature. Youll have a physical exam. The health care provider will check your ears, nose, and throat. You likely wont need any tests. If ABRS comes  back, you may have a culture or other tests.  Treatment for ABRS  Treatment may include:  · Antibiotic medicine. This is for symptoms that last for at least 10 to 14 days.  · Nasal corticosteroid medicine. Drops or spray used in the nose can lessen swelling and congestion.  · Over-the-counter pain medicine. This is to lessen sinus pain and pressure.  · Nasal decongestant medicine. Spray or drops may help to lessen congestion. Do not use them for more than a few days.  · Salt wash (saline irrigation). This can help to loosen mucus.  Possible complications of ABRS  ABRS may come back or become long-term (chronic).  In rare cases, ABRS may cause complications such as:   · Inflamed tissue around the brain and spinal cord (meningitis)  · Inflamed tissue around the eyes (orbital cellulitis)  · Inflamed bones around the sinuses (osteitis)  These problems may need to be treated in a hospital with intravenous (IV) antibiotic medicine or surgery.  When to call the health care provider  Call your health care provider if you have any of the following:  · Symptoms that dont get better, or get worse  · Symptoms that dont get better after 3 to 5 days on antibiotics  · Trouble seeing  · Swelling around your eyes  · Confusion or trouble staying awake   Date Last Reviewed: 3/3/2015  © 8612-1855 [employer]. [address] [place] [address]. All rights reserved. This information is not intended as a substitute for professional medical care. Always follow your healthcare professional's instructions.        Sinusitis (Antibiotic Treatment)    The sinuses are air-filled spaces within the bones of the face. They connect to the inside of the nose. Sinusitis is an inflammation of the tissue lining the sinus cavity. Sinus inflammation can occur during a cold. It can also be due to allergies to pollens and other particles in the air. Sinusitis can cause symptoms of sinus congestion and fullness. A sinus infection  causes fever, headache and facial pain. There is often green or yellow drainage from the nose or into the back of the throat (post-nasal drip). You have been given antibiotics to treat this condition.  Home care:  · Take the full course of antibiotics as instructed. Do not stop taking them, even if you feel better.  · Drink plenty of water, hot tea, and other liquids. This may help thin mucus. It also may promote sinus drainage.  · Heat may help soothe painful areas of the face. Use a towel soaked in hot water. Or,  the shower and direct the hot spray onto your face. Using a vaporizer along with a menthol rub at night may also help.   · An expectorant containing guaifenesin may help thin the mucus and promote drainage from the sinuses.  · Over-the-counter decongestants may be used unless a similar medicine was prescribed. Nasal sprays work the fastest. Use one that contains phenylephrine or oxymetazoline. First blow the nose gently. Then use the spray. Do not use these medicines more often than directed on the label or symptoms may get worse. You may also use tablets containing pseudoephedrine. Avoid products that combine ingredients, because side effects may be increased. Read labels. You can also ask the pharmacist for help. (NOTE: Persons with high blood pressure should not use decongestants. They can raise blood pressure.)  · Over-the-counter antihistamines may help if allergies contributed to your sinusitis.    · Do not use nasal rinses or irrigation during an acute sinus infection, unless told to by your health care provider. Rinsing may spread the infection to other sinuses.  · Use acetaminophen or ibuprofen to control pain, unless another pain medicine was prescribed. (If you have chronic liver or kidney disease or ever had a stomach ulcer, talk with your doctor before using these medicines. Aspirin should never be used in anyone under 18 years of age who is ill with a fever. It may cause severe  liver damage.)  · Don't smoke. This can worsen symptoms.  Follow-up care  Follow up with your healthcare provider or our staff if you are not improving within the next week.  When to seek medical advice  Call your healthcare provider if any of these occur:  · Facial pain or headache becoming more severe  · Stiff neck  · Unusual drowsiness or confusion  · Swelling of the forehead or eyelids  · Vision problems, including blurred or double vision  · Fever of 100.4ºF (38ºC) or higher, or as directed by your healthcare provider  · Seizure  · Breathing problems  · Symptoms not resolving within 10 days  Date Last Reviewed: 4/13/2015  © 0943-7386 [employer]. [address] [place] [address], [place] [address]. All rights reserved. This information is not intended as a substitute for professional medical care. Always follow your healthcare professional's instructions.

## 2020-02-20 NOTE — PROGRESS NOTES
"Subjective:       Patient ID: Erik Rodriguez is a 65 y.o. male.    Vitals:  height is 5' 10" (1.778 m) and weight is 88 kg (194 lb). His temperature is 100.7 °F (38.2 °C) (abnormal). His blood pressure is 125/75 and his pulse is 73. His respiration is 16 and oxygen saturation is 98%.     Chief Complaint: Sinus Problem; Cough; and Headache    Patient c/o fever, chill, sweating, fatigue, post nasal drip, sore throat, sinus pressure/pain, productive cough,muffled ears. OTC medication taken with mild relief.    Sinus Problem   This is a new problem. The current episode started in the past 7 days. The problem has been gradually worsening since onset. The maximum temperature recorded prior to his arrival was 100.4 - 100.9 F. Associated symptoms include chills, congestion, coughing, diaphoresis, headaches, sinus pressure and a sore throat. Pertinent negatives include no ear pain or shortness of breath. Past treatments include acetaminophen. The treatment provided mild relief.   Cough   This is a new problem. The current episode started in the past 7 days. The problem has been gradually worsening. The cough is productive of sputum. Associated symptoms include chills, a fever, headaches, postnasal drip and a sore throat. Pertinent negatives include no ear pain, eye redness, hemoptysis, myalgias, rash, shortness of breath or wheezing. He has tried rest and OTC cough suppressant for the symptoms. The treatment provided mild relief.   Headache    This is a new problem. The problem occurs intermittently. The problem has been unchanged. The pain is located in the frontal region. The quality of the pain is described as pulsating. The pain is at a severity of 6/10. The pain is moderate. Associated symptoms include coughing, a fever, sinus pressure and a sore throat. Pertinent negatives include no ear pain, eye redness, nausea or vomiting. He has tried acetaminophen for the symptoms. The treatment provided mild relief. "       Constitution: Positive for chills, sweating, fatigue and fever.   HENT: Positive for congestion, postnasal drip, sinus pain, sinus pressure and sore throat. Negative for ear pain and voice change.    Neck: Negative for painful lymph nodes.   Eyes: Negative for eye redness.   Respiratory: Positive for cough and sputum production (yellowish green). Negative for chest tightness, bloody sputum, COPD, shortness of breath, stridor, wheezing and asthma.    Gastrointestinal: Negative for nausea and vomiting.   Musculoskeletal: Negative for muscle ache.   Skin: Negative for rash.   Allergic/Immunologic: Negative for seasonal allergies and asthma.   Neurological: Positive for headaches.   Hematologic/Lymphatic: Negative for swollen lymph nodes.       Objective:      Physical Exam   Constitutional: He is oriented to person, place, and time. He appears well-developed and well-nourished. He is cooperative.  Non-toxic appearance. He does not have a sickly appearance. He does not appear ill. No distress.   HENT:   Head: Normocephalic and atraumatic.   Right Ear: Hearing, tympanic membrane, external ear and ear canal normal.   Left Ear: Hearing, tympanic membrane, external ear and ear canal normal.   Nose: No mucosal edema, rhinorrhea or nasal deformity. No epistaxis. Right sinus exhibits no maxillary sinus tenderness and no frontal sinus tenderness. Left sinus exhibits no maxillary sinus tenderness and no frontal sinus tenderness.   Mouth/Throat: Uvula is midline, oropharynx is clear and moist and mucous membranes are normal. No trismus in the jaw. Normal dentition. No uvula swelling. No oropharyngeal exudate, posterior oropharyngeal edema or posterior oropharyngeal erythema.   Nasal congestion  Sinus ttp  Post op mucosal draiange   Eyes: Conjunctivae and lids are normal. No scleral icterus.   Neck: Trachea normal, full passive range of motion without pain and phonation normal. Neck supple. No neck rigidity. No edema and no  erythema present.   Cardiovascular: Normal rate, regular rhythm, normal heart sounds, intact distal pulses and normal pulses.   Pulmonary/Chest: Effort normal and breath sounds normal. No respiratory distress. He has no decreased breath sounds. He has no rhonchi.   Intermittent coughing   Abdominal: Normal appearance.   Musculoskeletal: Normal range of motion. He exhibits no edema or deformity.   Neurological: He is alert and oriented to person, place, and time. He exhibits normal muscle tone. Coordination normal.   Skin: Skin is warm, dry, intact, not diaphoretic and not pale.   Psychiatric: He has a normal mood and affect. His speech is normal. Cognition and memory are normal.   Nursing note and vitals reviewed.      flu swab negative  Assessment:       1. Acute bacterial sinusitis    2. Fever, unspecified fever cause        Plan:         Acute bacterial sinusitis    Fever, unspecified fever cause  -     POCT Influenza A/B    Other orders  -     ibuprofen tablet 600 mg  -     betamethasone acetate-betamethasone sodium phosphate injection 9 mg  -     amoxicillin-clavulanate 875-125mg (AUGMENTIN) 875-125 mg per tablet; Take 1 tablet by mouth every 12 (twelve) hours. for 10 days  Dispense: 20 tablet; Refill: 0         Patient Instructions     Rest and hydrate with plenty of fluids  Flu swab negative  Celestone shot 1.5 cc given in clinic  Augmentin prescription  Over-the-counter nasal spray like saline or Flonase  Over-the-counter Zyrtec or Claritin or Allegra  Over-the-counter Mucinex DM twice daily for cough  Ibuprofen 600 mg every 6 hr for fever or body aches or headache  Review sinusitis sheet      Acute Bacterial Rhinosinusitis (ABRS)    Acute bacterial rhinosinusitis (ABRS) is an infection of your nasal cavity and sinuses. Its caused by bacteria. Acute means that youve had symptoms for less than 12 weeks.  Understanding your sinuses  The nasal cavity is the large air-filled space behind your nose. The sinuses  are a group of spaces formed by the bones of your face. They connect with your nasal cavity. ABRS causes the tissue lining these spaces to become inflamed. Mucus may not drain normally. This leads to facial pain and other symptoms.  What causes ABRS?  ABRS most often follows an upper respiratory infection caused by a virus. Bacteria then infect the lining of your nasal cavity and sinuses. But you can also get ABRS if you have:  · Nasal allergies  · Long-term nasal swelling and congestion not caused by allergies  · Blockage in the nose  Symptoms of ABRS  The symptoms of ABRS may be different for each person, and can include:  · Nasal congestion  · Runny nose  · Fluid draining from the nose down the throat (postnasal drip)  · Headache  · Cough  · Pain in the sinuses  · Thick, colored fluid from the nose (mucus)  · Fever  Diagnosing ABRS  ABRS may be diagnosed if youve had an upper respiratory infection like a cold and cough for longer than 10 to 14 days. Your health care provider will ask about your symptoms and your medical history. The provider will check your vital signs, including your temperature. Youll have a physical exam. The health care provider will check your ears, nose, and throat. You likely wont need any tests. If ABRS comes back, you may have a culture or other tests.  Treatment for ABRS  Treatment may include:  · Antibiotic medicine. This is for symptoms that last for at least 10 to 14 days.  · Nasal corticosteroid medicine. Drops or spray used in the nose can lessen swelling and congestion.  · Over-the-counter pain medicine. This is to lessen sinus pain and pressure.  · Nasal decongestant medicine. Spray or drops may help to lessen congestion. Do not use them for more than a few days.  · Salt wash (saline irrigation). This can help to loosen mucus.  Possible complications of ABRS  ABRS may come back or become long-term (chronic).  In rare cases, ABRS may cause complications such as:   · Inflamed  tissue around the brain and spinal cord (meningitis)  · Inflamed tissue around the eyes (orbital cellulitis)  · Inflamed bones around the sinuses (osteitis)  These problems may need to be treated in a hospital with intravenous (IV) antibiotic medicine or surgery.  When to call the health care provider  Call your health care provider if you have any of the following:  · Symptoms that dont get better, or get worse  · Symptoms that dont get better after 3 to 5 days on antibiotics  · Trouble seeing  · Swelling around your eyes  · Confusion or trouble staying awake   Date Last Reviewed: 3/3/2015  © 2241-4075 [employer]. [address] [place] [address]. All rights reserved. This information is not intended as a substitute for professional medical care. Always follow your healthcare professional's instructions.        Sinusitis (Antibiotic Treatment)    The sinuses are air-filled spaces within the bones of the face. They connect to the inside of the nose. Sinusitis is an inflammation of the tissue lining the sinus cavity. Sinus inflammation can occur during a cold. It can also be due to allergies to pollens and other particles in the air. Sinusitis can cause symptoms of sinus congestion and fullness. A sinus infection causes fever, headache and facial pain. There is often green or yellow drainage from the nose or into the back of the throat (post-nasal drip). You have been given antibiotics to treat this condition.  Home care:  · Take the full course of antibiotics as instructed. Do not stop taking them, even if you feel better.  · Drink plenty of water, hot tea, and other liquids. This may help thin mucus. It also may promote sinus drainage.  · Heat may help soothe painful areas of the face. Use a towel soaked in hot water. Or,  the shower and direct the hot spray onto your face. Using a vaporizer along with a menthol rub at night may also help.   · An expectorant containing guaifenesin  may help thin the mucus and promote drainage from the sinuses.  · Over-the-counter decongestants may be used unless a similar medicine was prescribed. Nasal sprays work the fastest. Use one that contains phenylephrine or oxymetazoline. First blow the nose gently. Then use the spray. Do not use these medicines more often than directed on the label or symptoms may get worse. You may also use tablets containing pseudoephedrine. Avoid products that combine ingredients, because side effects may be increased. Read labels. You can also ask the pharmacist for help. (NOTE: Persons with high blood pressure should not use decongestants. They can raise blood pressure.)  · Over-the-counter antihistamines may help if allergies contributed to your sinusitis.    · Do not use nasal rinses or irrigation during an acute sinus infection, unless told to by your health care provider. Rinsing may spread the infection to other sinuses.  · Use acetaminophen or ibuprofen to control pain, unless another pain medicine was prescribed. (If you have chronic liver or kidney disease or ever had a stomach ulcer, talk with your doctor before using these medicines. Aspirin should never be used in anyone under 18 years of age who is ill with a fever. It may cause severe liver damage.)  · Don't smoke. This can worsen symptoms.  Follow-up care  Follow up with your healthcare provider or our staff if you are not improving within the next week.  When to seek medical advice  Call your healthcare provider if any of these occur:  · Facial pain or headache becoming more severe  · Stiff neck  · Unusual drowsiness or confusion  · Swelling of the forehead or eyelids  · Vision problems, including blurred or double vision  · Fever of 100.4ºF (38ºC) or higher, or as directed by your healthcare provider  · Seizure  · Breathing problems  · Symptoms not resolving within 10 days  Date Last Reviewed: 4/13/2015  © 3953-5611 [employer]. [address] [place] [address]  Road, [place] [address]. All rights reserved. This information is not intended as a substitute for professional medical care. Always follow your healthcare professional's instructions.

## 2020-02-27 ENCOUNTER — TELEPHONE (OUTPATIENT)
Dept: URGENT CARE | Facility: CLINIC | Age: 65
End: 2020-02-27

## 2020-02-27 NOTE — TELEPHONE ENCOUNTER
Patient returned call, states he's been having diarrhea for 4 days, spoke with provider was told to take immodium and yogure and finish last 3 days of medication

## 2020-03-02 PROBLEM — Z79.01 CHRONIC ANTICOAGULATION: Status: ACTIVE | Noted: 2020-03-02

## 2020-03-10 ENCOUNTER — OFFICE VISIT (OUTPATIENT)
Dept: URGENT CARE | Facility: CLINIC | Age: 65
End: 2020-03-10
Payer: MEDICARE

## 2020-03-10 VITALS
HEART RATE: 97 BPM | DIASTOLIC BLOOD PRESSURE: 87 MMHG | BODY MASS INDEX: 28.06 KG/M2 | OXYGEN SATURATION: 97 % | WEIGHT: 196 LBS | SYSTOLIC BLOOD PRESSURE: 145 MMHG | HEIGHT: 70 IN | TEMPERATURE: 98 F

## 2020-03-10 DIAGNOSIS — R09.81 SINUS CONGESTION: ICD-10-CM

## 2020-03-10 DIAGNOSIS — T78.40XA ALLERGIC STATE, INITIAL ENCOUNTER: Primary | ICD-10-CM

## 2020-03-10 DIAGNOSIS — R05.9 COUGH: ICD-10-CM

## 2020-03-10 PROCEDURE — 99214 PR OFFICE/OUTPT VISIT, EST, LEVL IV, 30-39 MIN: ICD-10-PCS | Mod: S$GLB,,, | Performed by: NURSE PRACTITIONER

## 2020-03-10 PROCEDURE — 99214 OFFICE O/P EST MOD 30 MIN: CPT | Mod: S$GLB,,, | Performed by: NURSE PRACTITIONER

## 2020-03-10 RX ORDER — ALBUTEROL SULFATE 90 UG/1
2 AEROSOL, METERED RESPIRATORY (INHALATION) EVERY 6 HOURS PRN
Qty: 18 G | Refills: 0 | Status: SHIPPED | OUTPATIENT
Start: 2020-03-10 | End: 2021-02-11 | Stop reason: ALTCHOICE

## 2020-03-10 RX ORDER — PREDNISONE 10 MG/1
TABLET ORAL
Qty: 18 TABLET | Refills: 0 | Status: SHIPPED | OUTPATIENT
Start: 2020-03-10 | End: 2020-03-19

## 2020-03-10 NOTE — PATIENT INSTRUCTIONS
Follow up with your doctor in a few days.  Return to the urgent care or go to the ER if symptoms get worse.    If wheezing, albuterol rescue inhaler every 4-6 hours as needed.  Start oral steroid today and take as directed.  Continue flonase, start claritin daily, continue singulair at night.  Hot steam shower, hot tea with honey/lemon, vicks vapor rub  Avoid outside especially b/t 5-10am.  Follow up with allergist.      Controlling Allergens: Pollen     Keep windows closed, especially when pollen counts are high, and use air conditioning instead.   Constant exposure to allergens means constant allergy symptoms. Thats why it's important to control or avoid the allergens that cause your symptoms. If you are allergic to pollen, the tips below may help. The more you do to keep from allergens, the better youll feel.  Pollen allergy  The pollen that causes allergies is usually not the pollen carried from plant to plant by insects such as butterflies and bees. The types of pollen that most commonly cause allergies are made by plants (trees, grasses, and weeds) that do not have flowers. These plants make small, light, dry pollen granules that are blown from plant to plant by the wind.  Controlling pollen  Below are some tips to help you limit your exposure to pollen:  · Check pollen counts and avoid spending a lot of time outdoors when counts are high. Pollen counts tend to be higher during warm, dry weather. They also tend to be higher during early morning and late afternoon hours. In some areas, daily pollen counts are reported in the paper and on the radio.  · After spending time outdoors, bathe or shower, wash your hair, and change your clothes.  · Stay indoors as much as you can on windy days.  · Keep windows closed and air conditioning on, if possible, in your car and your home.  · Have someone else do gardening, yard work, or other outdoor chores. Masks are available if you have to spend time outdoors.  · When  your allergies are at their worst each year, try getting away to a place where your allergies wont bother you as much. This might be a time to try to plan a vacation or visit a friend or relative.  · Talk with your healthcare provider about medicines that can help. And, whether or not you might benefit from seeing an allergist.  Pollen allergy is seasonal  People have allergies only when the pollen to which they are allergic is in the air. Each plant pollinates more or less the same from year to year. Exactly when a plant starts to pollinate seems to depend on geographical location--rather than on the weather.   Date Last Reviewed: 9/1/2016  © 1240-8896 [employer]. [address] [place] [address], [place] [address]. All rights reserved. This information is not intended as a substitute for professional medical care. Always follow your healthcare professional's instructions.

## 2020-03-10 NOTE — PROGRESS NOTES
"Subjective:       Patient ID: Erik Rodriguez is a 65 y.o. male.    Vitals:  height is 5' 10" (1.778 m) and weight is 88.9 kg (196 lb). His temperature is 98.3 °F (36.8 °C). His blood pressure is 145/87 (abnormal) and his pulse is 97. His oxygen saturation is 97%.     Chief Complaint: Sinus Problem    Patient presents to clinic today with cough, congestion and slightly sore throat on and off since February 16, 2020. Patient has been treated recenlty for the same symptoms and feels like the symptoms have returned and are worse than before. Treated 3 weeks ago with augmentin and steroid injection. Reports hx of severe allergies / hx of allergy shots, sinus surgery. Currently taking flonase, sinus rinse and just started on singulair this week. Scheduled with allergy in the near future - within the next 1-2 weeks. Denies fever/body aches/chills. Reports feeling of chest tightness/burning, wheezing at times.    Sinus Problem   This is a recurrent problem. The current episode started 1 to 4 weeks ago. The problem is unchanged. There has been no fever. Associated symptoms include congestion, coughing and a sore throat. Pertinent negatives include no chills, diaphoresis, ear pain, headaches, hoarse voice, neck pain, shortness of breath, sinus pressure, sneezing or swollen glands. Past treatments include antibiotics (steroid shot, antibiotics, Mucinex D, Singulair). The treatment provided mild relief.       Constitution: Negative for chills, sweating, fatigue and fever.   HENT: Positive for congestion and sore throat. Negative for ear pain and sinus pressure.    Neck: Negative for neck pain and painful lymph nodes.   Cardiovascular: Negative for chest pain and leg swelling.   Eyes: Negative for double vision and blurred vision.   Respiratory: Positive for cough. Negative for shortness of breath.    Gastrointestinal: Negative for nausea, vomiting and diarrhea.   Genitourinary: Negative for dysuria, frequency and urgency. "   Musculoskeletal: Negative for joint pain, joint swelling, muscle cramps and muscle ache.   Skin: Negative for color change, pale and rash.   Allergic/Immunologic: Negative for seasonal allergies and sneezing.   Neurological: Negative for dizziness, history of vertigo, light-headedness, passing out and headaches.   Hematologic/Lymphatic: Negative for swollen lymph nodes, easy bruising/bleeding and history of blood clots. Does not bruise/bleed easily.   Psychiatric/Behavioral: Negative for nervous/anxious, sleep disturbance and depression. The patient is not nervous/anxious.        Objective:      Physical Exam   Constitutional: He is oriented to person, place, and time. He appears well-developed and well-nourished. He is cooperative.  Non-toxic appearance. He does not have a sickly appearance. He does not appear ill. No distress.   HENT:   Head: Normocephalic and atraumatic.   Right Ear: Hearing, tympanic membrane, external ear and ear canal normal.   Left Ear: Hearing, tympanic membrane, external ear and ear canal normal.   Nose: Mucosal edema and rhinorrhea present. No nasal deformity. No epistaxis. Right sinus exhibits maxillary sinus tenderness. Right sinus exhibits no frontal sinus tenderness. Left sinus exhibits maxillary sinus tenderness. Left sinus exhibits no frontal sinus tenderness.   Mouth/Throat: Uvula is midline and mucous membranes are normal. No trismus in the jaw. Normal dentition. No uvula swelling. Posterior oropharyngeal erythema and cobblestoning present. No oropharyngeal exudate or posterior oropharyngeal edema.   Eyes: Conjunctivae and lids are normal. No scleral icterus.   Neck: Trachea normal, full passive range of motion without pain and phonation normal. Neck supple. No neck rigidity. No edema and no erythema present.   Cardiovascular: Normal rate, regular rhythm, normal heart sounds, intact distal pulses and normal pulses.   Pulses:       Radial pulses are 2+ on the right side, and 2+ on  the left side.   Pulmonary/Chest: Effort normal and breath sounds normal. No stridor. No respiratory distress. He has no decreased breath sounds. He has no wheezes. He has no rhonchi.   Abdominal: Normal appearance.   Musculoskeletal: Normal range of motion. He exhibits no edema or deformity.   Neurological: He is alert and oriented to person, place, and time. He exhibits normal muscle tone. Coordination normal.   Skin: Skin is warm, dry, intact, not diaphoretic and not pale.   Psychiatric: He has a normal mood and affect. His speech is normal and behavior is normal. Judgment and thought content normal. Cognition and memory are normal.   Nursing note and vitals reviewed.        Assessment:       1. Allergic state, initial encounter    2. Sinus congestion    3. Cough        Plan:     steroid burst, start antihistamine in addition to flonase, singulair. F/u with allergy. vinny prn for wheezing    Allergic state, initial encounter  -     predniSONE (DELTASONE) 10 MG tablet; Take 3 tablets (30 mg total) by mouth once daily for 3 days, THEN 2 tablets (20 mg total) once daily for 3 days, THEN 1 tablet (10 mg total) once daily for 3 days.  Dispense: 18 tablet; Refill: 0  -     albuterol (PROAIR HFA) 90 mcg/actuation inhaler; Inhale 2 puffs into the lungs every 6 (six) hours as needed for Wheezing. Rescue  Dispense: 18 g; Refill: 0    Sinus congestion  -     predniSONE (DELTASONE) 10 MG tablet; Take 3 tablets (30 mg total) by mouth once daily for 3 days, THEN 2 tablets (20 mg total) once daily for 3 days, THEN 1 tablet (10 mg total) once daily for 3 days.  Dispense: 18 tablet; Refill: 0    Cough  -     albuterol (PROAIR HFA) 90 mcg/actuation inhaler; Inhale 2 puffs into the lungs every 6 (six) hours as needed for Wheezing. Rescue  Dispense: 18 g; Refill: 0      Patient Instructions     Follow up with your doctor in a few days.  Return to the urgent care or go to the ER if symptoms get worse.    If wheezing, albuterol rescue  inhaler every 4-6 hours as needed.  Start oral steroid today and take as directed.  Continue flonase, start claritin daily, continue singulair at night.  Hot steam shower, hot tea with honey/lemon, vicks vapor rub  Avoid outside especially b/t 5-10am.  Follow up with allergist.      Controlling Allergens: Pollen     Keep windows closed, especially when pollen counts are high, and use air conditioning instead.   Constant exposure to allergens means constant allergy symptoms. Thats why it's important to control or avoid the allergens that cause your symptoms. If you are allergic to pollen, the tips below may help. The more you do to keep from allergens, the better youll feel.  Pollen allergy  The pollen that causes allergies is usually not the pollen carried from plant to plant by insects such as butterflies and bees. The types of pollen that most commonly cause allergies are made by plants (trees, grasses, and weeds) that do not have flowers. These plants make small, light, dry pollen granules that are blown from plant to plant by the wind.  Controlling pollen  Below are some tips to help you limit your exposure to pollen:  · Check pollen counts and avoid spending a lot of time outdoors when counts are high. Pollen counts tend to be higher during warm, dry weather. They also tend to be higher during early morning and late afternoon hours. In some areas, daily pollen counts are reported in the paper and on the radio.  · After spending time outdoors, bathe or shower, wash your hair, and change your clothes.  · Stay indoors as much as you can on windy days.  · Keep windows closed and air conditioning on, if possible, in your car and your home.  · Have someone else do gardening, yard work, or other outdoor chores. Masks are available if you have to spend time outdoors.  · When your allergies are at their worst each year, try getting away to a place where your allergies wont bother you as much. This might be a time to  try to plan a vacation or visit a friend or relative.  · Talk with your healthcare provider about medicines that can help. And, whether or not you might benefit from seeing an allergist.  Pollen allergy is seasonal  People have allergies only when the pollen to which they are allergic is in the air. Each plant pollinates more or less the same from year to year. Exactly when a plant starts to pollinate seems to depend on geographical location--rather than on the weather.   Date Last Reviewed: 9/1/2016  © 4143-1696 [employer]. [address] [place] [address]. All rights reserved. This information is not intended as a substitute for professional medical care. Always follow your healthcare professional's instructions.

## 2021-02-11 ENCOUNTER — OFFICE VISIT (OUTPATIENT)
Dept: DERMATOLOGY | Facility: CLINIC | Age: 66
End: 2021-02-11
Payer: MEDICARE

## 2021-02-11 VITALS — HEIGHT: 70 IN | BODY MASS INDEX: 28.06 KG/M2 | WEIGHT: 196 LBS

## 2021-02-11 DIAGNOSIS — D48.9 NEOPLASM OF UNCERTAIN BEHAVIOR: Primary | ICD-10-CM

## 2021-02-11 DIAGNOSIS — L82.1 SEBORRHEIC KERATOSES: ICD-10-CM

## 2021-02-11 DIAGNOSIS — D22.9 MULTIPLE BENIGN NEVI: ICD-10-CM

## 2021-02-11 DIAGNOSIS — D18.01 ANGIOMA OF SKIN: ICD-10-CM

## 2021-02-11 DIAGNOSIS — L81.4 LENTIGINES: ICD-10-CM

## 2021-02-11 DIAGNOSIS — D22.39 FIBROUS PAPULE OF NOSE: ICD-10-CM

## 2021-02-11 DIAGNOSIS — Z12.83 SCREENING EXAM FOR SKIN CANCER: ICD-10-CM

## 2021-02-11 PROCEDURE — 99213 OFFICE O/P EST LOW 20 MIN: CPT | Mod: PBBFAC,PO,25 | Performed by: DERMATOLOGY

## 2021-02-11 PROCEDURE — 11102 TANGNTL BX SKIN SINGLE LES: CPT | Mod: S$PBB,,, | Performed by: DERMATOLOGY

## 2021-02-11 PROCEDURE — 99999 PR PBB SHADOW E&M-EST. PATIENT-LVL III: ICD-10-PCS | Mod: PBBFAC,,, | Performed by: DERMATOLOGY

## 2021-02-11 PROCEDURE — 88305 TISSUE EXAM BY PATHOLOGIST: CPT | Performed by: PATHOLOGY

## 2021-02-11 PROCEDURE — 11102 PR TANGENTIAL BIOPSY, SKIN, SINGLE LESION: ICD-10-PCS | Mod: S$PBB,,, | Performed by: DERMATOLOGY

## 2021-02-11 PROCEDURE — 88305 TISSUE EXAM BY PATHOLOGIST: ICD-10-PCS | Mod: 26,,, | Performed by: PATHOLOGY

## 2021-02-11 PROCEDURE — 88305 TISSUE EXAM BY PATHOLOGIST: CPT | Mod: 26,,, | Performed by: PATHOLOGY

## 2021-02-11 PROCEDURE — 99999 PR PBB SHADOW E&M-EST. PATIENT-LVL III: CPT | Mod: PBBFAC,,, | Performed by: DERMATOLOGY

## 2021-02-11 PROCEDURE — 99213 OFFICE O/P EST LOW 20 MIN: CPT | Mod: 25,S$PBB,, | Performed by: DERMATOLOGY

## 2021-02-11 PROCEDURE — 99213 PR OFFICE/OUTPT VISIT, EST, LEVL III, 20-29 MIN: ICD-10-PCS | Mod: 25,S$PBB,, | Performed by: DERMATOLOGY

## 2021-02-11 PROCEDURE — 11102 TANGNTL BX SKIN SINGLE LES: CPT | Mod: PBBFAC,PO | Performed by: DERMATOLOGY

## 2021-02-15 LAB
FINAL PATHOLOGIC DIAGNOSIS: NORMAL
GROSS: NORMAL
MICROSCOPIC EXAM: NORMAL

## 2021-11-02 ENCOUNTER — IMMUNIZATION (OUTPATIENT)
Dept: PRIMARY CARE CLINIC | Facility: CLINIC | Age: 66
End: 2021-11-02
Payer: MEDICARE

## 2021-11-02 DIAGNOSIS — Z23 NEED FOR VACCINATION: Primary | ICD-10-CM

## 2021-11-02 PROCEDURE — 0004A COVID-19, MRNA, LNP-S, PF, 30 MCG/0.3 ML DOSE VACCINE: CPT | Mod: CV19,S$GLB,, | Performed by: INTERNAL MEDICINE

## 2021-11-02 PROCEDURE — 0004A COVID-19, MRNA, LNP-S, PF, 30 MCG/0.3 ML DOSE VACCINE: ICD-10-PCS | Mod: CV19,S$GLB,, | Performed by: INTERNAL MEDICINE

## 2021-11-02 PROCEDURE — 91300 COVID-19, MRNA, LNP-S, PF, 30 MCG/0.3 ML DOSE VACCINE: CPT | Mod: PBBFAC | Performed by: INTERNAL MEDICINE

## 2021-11-16 ENCOUNTER — OFFICE VISIT (OUTPATIENT)
Dept: URGENT CARE | Facility: CLINIC | Age: 66
End: 2021-11-16
Payer: MEDICARE

## 2021-11-16 VITALS
BODY MASS INDEX: 27.92 KG/M2 | RESPIRATION RATE: 16 BRPM | DIASTOLIC BLOOD PRESSURE: 87 MMHG | SYSTOLIC BLOOD PRESSURE: 145 MMHG | OXYGEN SATURATION: 99 % | HEIGHT: 70 IN | TEMPERATURE: 99 F | WEIGHT: 195 LBS | HEART RATE: 75 BPM

## 2021-11-16 DIAGNOSIS — J01.90 ACUTE BACTERIAL SINUSITIS: Primary | ICD-10-CM

## 2021-11-16 DIAGNOSIS — B96.89 ACUTE BACTERIAL SINUSITIS: Primary | ICD-10-CM

## 2021-11-16 PROCEDURE — 99213 OFFICE O/P EST LOW 20 MIN: CPT | Mod: S$GLB,,, | Performed by: NURSE PRACTITIONER

## 2021-11-16 PROCEDURE — 99213 PR OFFICE/OUTPT VISIT, EST, LEVL III, 20-29 MIN: ICD-10-PCS | Mod: S$GLB,,, | Performed by: NURSE PRACTITIONER

## 2021-11-16 RX ORDER — AMOXICILLIN AND CLAVULANATE POTASSIUM 875; 125 MG/1; MG/1
1 TABLET, FILM COATED ORAL EVERY 12 HOURS
Qty: 14 TABLET | Refills: 0 | Status: SHIPPED | OUTPATIENT
Start: 2021-11-16 | End: 2021-11-23

## 2022-03-25 ENCOUNTER — OFFICE VISIT (OUTPATIENT)
Dept: URGENT CARE | Facility: CLINIC | Age: 67
End: 2022-03-25
Payer: MEDICARE

## 2022-03-25 VITALS
HEART RATE: 80 BPM | HEIGHT: 70 IN | WEIGHT: 195 LBS | BODY MASS INDEX: 27.92 KG/M2 | TEMPERATURE: 98 F | SYSTOLIC BLOOD PRESSURE: 140 MMHG | DIASTOLIC BLOOD PRESSURE: 91 MMHG | OXYGEN SATURATION: 98 % | RESPIRATION RATE: 17 BRPM

## 2022-03-25 DIAGNOSIS — B96.89 BACTERIAL URI: Primary | ICD-10-CM

## 2022-03-25 DIAGNOSIS — J06.9 BACTERIAL URI: Primary | ICD-10-CM

## 2022-03-25 DIAGNOSIS — R05.9 COUGH: ICD-10-CM

## 2022-03-25 LAB
CTP QC/QA: YES
SARS-COV-2 RDRP RESP QL NAA+PROBE: NEGATIVE

## 2022-03-25 PROCEDURE — U0002: ICD-10-PCS | Mod: QW,CR,S$GLB,

## 2022-03-25 PROCEDURE — U0002 COVID-19 LAB TEST NON-CDC: HCPCS | Mod: QW,CR,S$GLB,

## 2022-03-25 PROCEDURE — 99213 OFFICE O/P EST LOW 20 MIN: CPT | Mod: S$GLB,,,

## 2022-03-25 PROCEDURE — 99213 PR OFFICE/OUTPT VISIT, EST, LEVL III, 20-29 MIN: ICD-10-PCS | Mod: S$GLB,,,

## 2022-03-25 RX ORDER — AMOXICILLIN 500 MG/1
500 TABLET, FILM COATED ORAL EVERY 12 HOURS
Qty: 20 TABLET | Refills: 0 | Status: SHIPPED | OUTPATIENT
Start: 2022-03-25 | End: 2022-04-04

## 2022-03-25 NOTE — PROGRESS NOTES
"Subjective:       Patient ID: Erik Rodriguez is a 67 y.o. male.    Vitals:  height is 5' 10" (1.778 m) and weight is 88.5 kg (195 lb). His temperature is 97.6 °F (36.4 °C). His blood pressure is 140/91 (abnormal) and his pulse is 80. His respiration is 17 and oxygen saturation is 98%.     Chief Complaint: Cough    Pt reports that he developed a cough around two weeks ago. He says that he also has had runny nose and nasal congestion.     Provider note starts below:  Patient presents with cough for 2 weeks associated with runny nose and nasal congestion.  He states beginning he had body aches and chills but this is resolved.  He has been taking over-the-counter cough suppressants including Mucinex, saline sprays, Flonase, ProAir and he also gets allergy shots.  None of these have relieved his symptoms.  Denies any chest pain, shortness a breath or wheezing.  Patient is COVID vaccinated.    Cough  This is a chronic problem. The current episode started 1 to 4 weeks ago. The problem has been unchanged. The problem occurs constantly. The cough is non-productive. Associated symptoms include nasal congestion, postnasal drip and rhinorrhea. Pertinent negatives include no chest pain, chills, fever, headaches, myalgias, sore throat, shortness of breath or wheezing. Nothing aggravates the symptoms. He has tried OTC cough suppressant and prescription cough suppressant for the symptoms. The treatment provided mild relief. His past medical history is significant for bronchitis and environmental allergies.       Constitution: Negative for chills and fever.   HENT: Positive for congestion and postnasal drip. Negative for sinus pain, sinus pressure and sore throat.    Cardiovascular: Negative for chest pain.   Respiratory: Positive for cough. Negative for shortness of breath and wheezing.    Gastrointestinal: Negative for abdominal pain, nausea, vomiting and diarrhea.   Musculoskeletal: Negative for muscle ache.   Allergic/Immunologic: " Positive for environmental allergies.   Neurological: Negative for headaches.       Objective:      Physical Exam   Constitutional: He is oriented to person, place, and time. He appears well-developed. He is cooperative.  Non-toxic appearance. He does not appear ill. No distress.   HENT:   Head: Normocephalic and atraumatic.   Ears:   Right Ear: Hearing, tympanic membrane, external ear and ear canal normal.   Left Ear: Hearing, tympanic membrane, external ear and ear canal normal.   Nose: Rhinorrhea present. No mucosal edema or nasal deformity. No epistaxis. Right sinus exhibits no maxillary sinus tenderness and no frontal sinus tenderness. Left sinus exhibits no maxillary sinus tenderness and no frontal sinus tenderness.   Mouth/Throat: Uvula is midline, oropharynx is clear and moist and mucous membranes are normal. Mucous membranes are moist. No trismus in the jaw. Normal dentition. No uvula swelling. No oropharyngeal exudate, posterior oropharyngeal edema or posterior oropharyngeal erythema. Oropharynx is clear.   Eyes: Conjunctivae and lids are normal. No scleral icterus.   Neck: Trachea normal and phonation normal. Neck supple. No edema present. No erythema present. No neck rigidity present.   Cardiovascular: Normal rate, regular rhythm, normal heart sounds and normal pulses.   Pulmonary/Chest: Effort normal and breath sounds normal. No respiratory distress. He has no decreased breath sounds. He has no wheezes. He has no rhonchi.   Abdominal: Normal appearance.   Musculoskeletal: Normal range of motion.         General: No deformity. Normal range of motion.   Lymphadenopathy:     He has no cervical adenopathy.   Neurological: He is alert and oriented to person, place, and time. He exhibits normal muscle tone. Coordination normal.   Skin: Skin is warm, dry, intact, not diaphoretic and not pale.   Psychiatric: His speech is normal and behavior is normal. Judgment and thought content normal.   Nursing note and  vitals reviewed.        Results for orders placed or performed in visit on 03/25/22   POCT COVID-19 Rapid Screening   Result Value Ref Range    POC Rapid COVID Negative Negative     Acceptable Yes        Assessment:       1. Bacterial URI    2. Cough          Plan:     labs reviewed.     Bacterial URI  -     amoxicillin (AMOXIL) 500 MG Tab; Take 1 tablet (500 mg total) by mouth every 12 (twelve) hours. for 10 days  Dispense: 20 tablet; Refill: 0    Cough  -     POCT COVID-19 Rapid Screening      Patient Instructions   PLEASE READ ALL DISCHARGE INSTRUCTIONS    - Rest.    - Drink plenty of fluids.  - You have been given an antibiotic to treat your condition today.    - Please complete the antibiotic as directed on the bottle.   - you can take over-the-counter probiotics during and after antibiotic use to help preserve gut steven and reduce gastrointestinal symptoms     - You can take over-the-counter claritin, zyrtec, allegra, or xyzal as directed. These are antihistamines that can help with runny nose, nasal congestion, sneezing, and helps to dry up post-nasal drip, which usually causes sore throat and cough.               - If you do NOT have high blood pressure, you may use a decongestant form (D)  of this medication (ie. Claritin- D, zyrtec-D, allegra-D) or if you do not take the D form, you can take sudafed (pseudoephedrine) over the counter, which is a decongestant. Do NOT take two decongestant (D) medications at the same time (such as mucinex-D and claritin-D or plain sudafed and claritin D). Dextromethorphan (DM) is a cough suppressant over the counter (ie. mucinex DM, robitussin, delsym; dayquil/nyquil has DM as well.)    -If you DO have high blood pressure, you may take Coricidin HBP for sinus congestion and Delsym for cough.      - You can use Flonase (fluticasone) nasal spray as directed for sinus congestion and postnasal drip. This is a steroid nasal spray that works locally over time to  decrease the inflammation in your nose/sinuses and help with allergic symptoms. This is not an quick- relief spray like afrin, but it works well if used daily.  Discontinue if you develop nose bleed  - Use nasal saline prior to Flonase.  - Use Ocean Spray Nasal Saline 1-3 puffs each nostril every 2-3 hours then blow out onto tissue. This is to irrigate the nasal passage way to clear the sinus openings. Use until sinus problem resolved.     - You can take plain Mucinex (guaifenesin) 1200 mg twice a day to help loosen mucous.      - Warm salt water gargles can help with sore throat     - Warm tea with honey can help with cough. Honey is a natural cough suppressant.    - Acetaminophen (tylenol) or Ibuprofen (advil,motrin) as directed as needed for fever/pain. Avoid tylenol if you have a history of liver disease. Do not take ibuprofen if you have a history of GI bleeding, kidney disease, or if you take blood thinners.     - You must understand that you have received an Urgent Care treatment only and that you may be released before all of your medical problems are known or treated.   - You, the patient, will arrange for follow up care as instructed.   - If your condition worsens or fails to improve we recommend that you receive another evaluation at the ER immediately or contact your PCP to discuss your concerns or return here.   - Follow up with your PCP or specialty clinic as directed in the next 1-2 weeks if not improved or as needed.  You can call [phone] to schedule an appointment with the appropriate provider.

## 2022-03-25 NOTE — PATIENT INSTRUCTIONS
PLEASE READ ALL DISCHARGE INSTRUCTIONS    - Rest.    - Drink plenty of fluids.  - You have been given an antibiotic to treat your condition today.    - Please complete the antibiotic as directed on the bottle.   - you can take over-the-counter probiotics during and after antibiotic use to help preserve gut steven and reduce gastrointestinal symptoms     - You can take over-the-counter claritin, zyrtec, allegra, or xyzal as directed. These are antihistamines that can help with runny nose, nasal congestion, sneezing, and helps to dry up post-nasal drip, which usually causes sore throat and cough.               - If you do NOT have high blood pressure, you may use a decongestant form (D)  of this medication (ie. Claritin- D, zyrtec-D, allegra-D) or if you do not take the D form, you can take sudafed (pseudoephedrine) over the counter, which is a decongestant. Do NOT take two decongestant (D) medications at the same time (such as mucinex-D and claritin-D or plain sudafed and claritin D). Dextromethorphan (DM) is a cough suppressant over the counter (ie. mucinex DM, robitussin, delsym; dayquil/nyquil has DM as well.)    -If you DO have high blood pressure, you may take Coricidin HBP for sinus congestion and Delsym for cough.      - You can use Flonase (fluticasone) nasal spray as directed for sinus congestion and postnasal drip. This is a steroid nasal spray that works locally over time to decrease the inflammation in your nose/sinuses and help with allergic symptoms. This is not an quick- relief spray like afrin, but it works well if used daily.  Discontinue if you develop nose bleed  - Use nasal saline prior to Flonase.  - Use Ocean Spray Nasal Saline 1-3 puffs each nostril every 2-3 hours then blow out onto tissue. This is to irrigate the nasal passage way to clear the sinus openings. Use until sinus problem resolved.     - You can take plain Mucinex (guaifenesin) 1200 mg twice a day to help loosen mucous.      - Warm  salt water gargles can help with sore throat     - Warm tea with honey can help with cough. Honey is a natural cough suppressant.    - Acetaminophen (tylenol) or Ibuprofen (advil,motrin) as directed as needed for fever/pain. Avoid tylenol if you have a history of liver disease. Do not take ibuprofen if you have a history of GI bleeding, kidney disease, or if you take blood thinners.     - You must understand that you have received an Urgent Care treatment only and that you may be released before all of your medical problems are known or treated.   - You, the patient, will arrange for follow up care as instructed.   - If your condition worsens or fails to improve we recommend that you receive another evaluation at the ER immediately or contact your PCP to discuss your concerns or return here.   - Follow up with your PCP or specialty clinic as directed in the next 1-2 weeks if not improved or as needed.  You can call [phone] to schedule an appointment with the appropriate provider.

## 2022-04-21 ENCOUNTER — IMMUNIZATION (OUTPATIENT)
Dept: PRIMARY CARE CLINIC | Facility: CLINIC | Age: 67
End: 2022-04-21
Payer: MEDICARE

## 2022-04-21 DIAGNOSIS — Z23 NEED FOR VACCINATION: Primary | ICD-10-CM

## 2022-04-21 PROCEDURE — 91305 COVID-19, MRNA, LNP-S, PF, 30 MCG/0.3 ML DOSE VACCINE (PFIZER): CPT | Mod: PBBFAC | Performed by: INTERNAL MEDICINE

## 2022-04-28 ENCOUNTER — LAB VISIT (OUTPATIENT)
Dept: LAB | Facility: HOSPITAL | Age: 67
End: 2022-04-28
Attending: INTERNAL MEDICINE
Payer: MEDICARE

## 2022-04-28 DIAGNOSIS — Z79.01 CHRONIC ANTICOAGULATION: ICD-10-CM

## 2022-04-28 DIAGNOSIS — E78.5 HYPERLIPIDEMIA, UNSPECIFIED HYPERLIPIDEMIA TYPE: ICD-10-CM

## 2022-04-28 DIAGNOSIS — Z12.5 SCREENING FOR PROSTATE CANCER: ICD-10-CM

## 2022-04-28 LAB
ALBUMIN SERPL BCP-MCNC: 4 G/DL (ref 3.5–5.2)
ALP SERPL-CCNC: 70 U/L (ref 55–135)
ALT SERPL W/O P-5'-P-CCNC: 28 U/L (ref 10–44)
ANION GAP SERPL CALC-SCNC: 10 MMOL/L (ref 8–16)
AST SERPL-CCNC: 27 U/L (ref 10–40)
BASOPHILS # BLD AUTO: 0.06 K/UL (ref 0–0.2)
BASOPHILS NFR BLD: 1.1 % (ref 0–1.9)
BILIRUB SERPL-MCNC: 1 MG/DL (ref 0.1–1)
BUN SERPL-MCNC: 17 MG/DL (ref 8–23)
CALCIUM SERPL-MCNC: 9.9 MG/DL (ref 8.7–10.5)
CHLORIDE SERPL-SCNC: 106 MMOL/L (ref 95–110)
CHOLEST SERPL-MCNC: 144 MG/DL (ref 120–199)
CHOLEST/HDLC SERPL: 3.2 {RATIO} (ref 2–5)
CO2 SERPL-SCNC: 26 MMOL/L (ref 23–29)
COMPLEXED PSA SERPL-MCNC: 1.8 NG/ML (ref 0–4)
CREAT SERPL-MCNC: 1 MG/DL (ref 0.5–1.4)
DIFFERENTIAL METHOD: ABNORMAL
EOSINOPHIL # BLD AUTO: 0.3 K/UL (ref 0–0.5)
EOSINOPHIL NFR BLD: 5.7 % (ref 0–8)
ERYTHROCYTE [DISTWIDTH] IN BLOOD BY AUTOMATED COUNT: 13.3 % (ref 11.5–14.5)
EST. GFR  (AFRICAN AMERICAN): >60 ML/MIN/1.73 M^2
EST. GFR  (NON AFRICAN AMERICAN): >60 ML/MIN/1.73 M^2
GLUCOSE SERPL-MCNC: 97 MG/DL (ref 70–110)
HCT VFR BLD AUTO: 46.1 % (ref 40–54)
HDLC SERPL-MCNC: 45 MG/DL (ref 40–75)
HDLC SERPL: 31.3 % (ref 20–50)
HGB BLD-MCNC: 15 G/DL (ref 14–18)
IMM GRANULOCYTES # BLD AUTO: 0.01 K/UL (ref 0–0.04)
IMM GRANULOCYTES NFR BLD AUTO: 0.2 % (ref 0–0.5)
LDLC SERPL CALC-MCNC: 74 MG/DL (ref 63–159)
LYMPHOCYTES # BLD AUTO: 1.4 K/UL (ref 1–4.8)
LYMPHOCYTES NFR BLD: 26.6 % (ref 18–48)
MCH RBC QN AUTO: 29.6 PG (ref 27–31)
MCHC RBC AUTO-ENTMCNC: 32.5 G/DL (ref 32–36)
MCV RBC AUTO: 91 FL (ref 82–98)
MONOCYTES # BLD AUTO: 0.6 K/UL (ref 0.3–1)
MONOCYTES NFR BLD: 11.3 % (ref 4–15)
NEUTROPHILS # BLD AUTO: 2.9 K/UL (ref 1.8–7.7)
NEUTROPHILS NFR BLD: 55.1 % (ref 38–73)
NONHDLC SERPL-MCNC: 99 MG/DL
NRBC BLD-RTO: 0 /100 WBC
PLATELET # BLD AUTO: 238 K/UL (ref 150–450)
PMV BLD AUTO: 8.6 FL (ref 9.2–12.9)
POTASSIUM SERPL-SCNC: 4.1 MMOL/L (ref 3.5–5.1)
PROT SERPL-MCNC: 7.3 G/DL (ref 6–8.4)
RBC # BLD AUTO: 5.07 M/UL (ref 4.6–6.2)
SODIUM SERPL-SCNC: 142 MMOL/L (ref 136–145)
TRIGL SERPL-MCNC: 125 MG/DL (ref 30–150)
WBC # BLD AUTO: 5.23 K/UL (ref 3.9–12.7)

## 2022-04-28 PROCEDURE — 84153 ASSAY OF PSA TOTAL: CPT | Performed by: INTERNAL MEDICINE

## 2022-04-28 PROCEDURE — 80053 COMPREHEN METABOLIC PANEL: CPT | Performed by: INTERNAL MEDICINE

## 2022-04-28 PROCEDURE — 36415 COLL VENOUS BLD VENIPUNCTURE: CPT | Performed by: INTERNAL MEDICINE

## 2022-04-28 PROCEDURE — 80061 LIPID PANEL: CPT | Performed by: INTERNAL MEDICINE

## 2022-04-28 PROCEDURE — 85025 COMPLETE CBC W/AUTO DIFF WBC: CPT | Performed by: INTERNAL MEDICINE

## 2022-09-13 ENCOUNTER — IMMUNIZATION (OUTPATIENT)
Dept: PRIMARY CARE CLINIC | Facility: CLINIC | Age: 67
End: 2022-09-13
Payer: MEDICARE

## 2022-09-13 DIAGNOSIS — Z23 NEED FOR VACCINATION: Primary | ICD-10-CM

## 2022-09-13 PROCEDURE — 91312 COVID-19, MRNA, LNP-S, BIVALENT BOOSTER, PF, 30 MCG/0.3 ML DOSE: ICD-10-PCS | Mod: S$GLB,,, | Performed by: INTERNAL MEDICINE

## 2022-09-13 PROCEDURE — 91312 COVID-19, MRNA, LNP-S, BIVALENT BOOSTER, PF, 30 MCG/0.3 ML DOSE: CPT | Mod: S$GLB,,, | Performed by: INTERNAL MEDICINE

## 2023-03-06 ENCOUNTER — OFFICE VISIT (OUTPATIENT)
Dept: PRIMARY CARE CLINIC | Facility: CLINIC | Age: 68
End: 2023-03-06
Payer: MEDICARE

## 2023-03-06 VITALS
HEART RATE: 76 BPM | OXYGEN SATURATION: 98 % | WEIGHT: 199.5 LBS | BODY MASS INDEX: 28.56 KG/M2 | DIASTOLIC BLOOD PRESSURE: 80 MMHG | SYSTOLIC BLOOD PRESSURE: 120 MMHG | HEIGHT: 70 IN

## 2023-03-06 DIAGNOSIS — G45.9 TIA (TRANSIENT ISCHEMIC ATTACK): ICD-10-CM

## 2023-03-06 DIAGNOSIS — J30.1 SEASONAL ALLERGIC RHINITIS DUE TO POLLEN: ICD-10-CM

## 2023-03-06 DIAGNOSIS — F41.8 SITUATIONAL ANXIETY: ICD-10-CM

## 2023-03-06 DIAGNOSIS — E78.2 MIXED HYPERLIPIDEMIA: Primary | ICD-10-CM

## 2023-03-06 DIAGNOSIS — M16.12 OSTEOARTHRITIS OF LEFT HIP, UNSPECIFIED OSTEOARTHRITIS TYPE: ICD-10-CM

## 2023-03-06 DIAGNOSIS — R09.81 SINUS CONGESTION: ICD-10-CM

## 2023-03-06 DIAGNOSIS — Z12.5 PROSTATE CANCER SCREENING: ICD-10-CM

## 2023-03-06 PROCEDURE — 99999 PR PBB SHADOW E&M-EST. PATIENT-LVL III: ICD-10-PCS | Mod: PBBFAC,,, | Performed by: INTERNAL MEDICINE

## 2023-03-06 PROCEDURE — 99213 OFFICE O/P EST LOW 20 MIN: CPT | Mod: PBBFAC | Performed by: INTERNAL MEDICINE

## 2023-03-06 PROCEDURE — 99999 PR PBB SHADOW E&M-EST. PATIENT-LVL III: CPT | Mod: PBBFAC,,, | Performed by: INTERNAL MEDICINE

## 2023-03-06 PROCEDURE — 99204 OFFICE O/P NEW MOD 45 MIN: CPT | Mod: S$PBB,,, | Performed by: INTERNAL MEDICINE

## 2023-03-06 PROCEDURE — 99204 PR OFFICE/OUTPT VISIT, NEW, LEVL IV, 45-59 MIN: ICD-10-PCS | Mod: S$PBB,,, | Performed by: INTERNAL MEDICINE

## 2023-03-06 RX ORDER — FLUTICASONE PROPIONATE 50 MCG
1 SPRAY, SUSPENSION (ML) NASAL 2 TIMES DAILY PRN
Qty: 48 G | Refills: 3 | Status: SHIPPED | OUTPATIENT
Start: 2023-03-06

## 2023-03-06 NOTE — PROGRESS NOTES
[facility] Note    Chief Complaint      Chief Complaint   Patient presents with    Establish Care     New Pt Estab Care       History of Present Illness      Erik Rodriguez is a 68 y.o. male who presents today for   Chief Complaint   Patient presents with    Establish Care     New Pt Estab Care   .  Patient comes to appointment here for establish visit with me . He is on a stable regimen for his chronic issues as documented below . He is seeing a corneal specialist for fuchs corneal dystrophy . He is dealing with oa issues in hip and knees . Cervical disc disease is stable after pt has not had surgery. Had colonoscopy in 2016 in [place] .     HPI    No problem-specific Assessment & Plan notes found for this encounter.       Problem List Items Addressed This Visit          Neuro    h/o TIA (transient ischemic attack)    Overview     Cont plavix , lipitor labs due in may             Psychiatric    Situational anxiety    Overview     Xanax he is rarely using  reviewed             ENT    Sinus congestion    Seasonal allergic rhinitis due to pollen    Overview     Stable on current reigmen cont same             Cardiac/Vascular    Hyperlipidemia - Primary    Overview     lipitor 20mg   Labs due on may 23            Orthopedic    Osteoarthritis of left hip    Overview     Dr barreto managing will be getting hip replacement soon           Other Visit Diagnoses       Prostate cancer screening                 Past Medical History:  Past Medical History:   Diagnosis Date    Allergy     Fuchs' corneal dystrophy of both eyes     Hyperlipidemia     Stroke     TIA (transient ischemic attack) 07/2010       Past Surgical History:  Past Surgical History:   Procedure Laterality Date    INJECTION OF STEROID  01/2020    pat reported    KNEE ARTHROSCOPY Left 01/2013    KNEE CARTILAGE SURGERY      left knee 09/2008; right knee 01/2015    NASAL SEPTUM SURGERY  02/2016    SKIN BIOPSY  02/2021    from  head-benign-pt reported    STEROID INJECTION HIP Left 02/22/2022    pt reported    TOTAL HIP ARTHROPLASTY Right 04/2013    pat reported / Done in [place]       Family History:  family history includes Arthritis in his paternal grandfather; Cancer in his father, maternal grandmother, and paternal grandmother; Heart disease in his paternal grandfather; Heart failure in his mother; Kidney failure in his mother; Stroke in his maternal grandfather.     Social History:  Social History     Socioeconomic History    Marital status:    Tobacco Use    Smoking status: Never    Smokeless tobacco: Never   Substance and Sexual Activity    Alcohol use: Yes     Comment: occasionally    Drug use: Never    Sexual activity: Yes       Review of Systems:   Review of Systems   Constitutional:  Negative for fever and weight loss.   HENT:  Negative for congestion, hearing loss and sore throat.    Eyes:  Negative for blurred vision.   Respiratory:  Negative for cough and shortness of breath.    Cardiovascular:  Negative for chest pain, palpitations, claudication and leg swelling.   Gastrointestinal:  Negative for abdominal pain, constipation, diarrhea and heartburn.   Genitourinary:  Negative for dysuria.   Musculoskeletal:  Positive for joint pain. Negative for back pain and myalgias.   Skin:  Negative for rash.   Neurological:  Negative for focal weakness and headaches.   Psychiatric/Behavioral:  Negative for depression and suicidal ideas. The patient is not nervous/anxious.       Medications:  Outpatient Encounter Medications as of 3/6/2023   Medication Sig Note Dispense Refill    acetaminophen (TYLENOL) 500 MG tablet Take 500 mg by mouth as needed for Pain.       atorvastatin (LIPITOR) 20 MG tablet Take 1 tablet (20 mg total) by mouth every evening.  90 tablet 3    clopidogreL (PLAVIX) 75 mg tablet Take 1 tablet (75 mg total) by mouth once daily.  90 tablet 3    ibuprofen (ADVIL,MOTRIN) 200 MG tablet Take 200 mg by mouth 2 (two)  "times daily as needed.  1/12/2018: Received from: External Pharmacy      montelukast (SINGULAIR) 10 mg tablet TAKE 1 TABLET(10 MG) BY MOUTH EVERY NIGHT  90 tablet 3    multivitamin capsule Take 1 capsule by mouth once daily.       olopatadine (PATANOL) 0.1 % ophthalmic solution Place 1 drop into both eyes 2 (two) times daily.  5 mL 5    psyllium (METAMUCIL) powder Take by mouth once daily.       [DISCONTINUED] fluticasone propionate (FLONASE) 50 mcg/actuation nasal spray 1 spray (50 mcg total) by Each Nostril route 2 (two) times daily as needed for Rhinitis or Allergies.  48 g 3    ALPRAZolam (XANAX) 0.25 MG tablet Take 1 tablet (0.25 mg total) by mouth 2 (two) times daily as needed for Anxiety.  30 tablet 1    fluticasone propionate (FLONASE) 50 mcg/actuation nasal spray 1 spray (50 mcg total) by Each Nostril route 2 (two) times daily as needed for Rhinitis or Allergies.  48 g 3    UNABLE TO FIND Allergy shot at Dr. Purvis every week.        No facility-administered encounter medications on file as of 3/6/2023.       Allergies:  Review of patient's allergies indicates:  No Known Allergies      Physical Exam      Vitals:    03/06/23 1507   BP: 120/80   Pulse: 76        Vital Signs  Pulse: 76  SpO2: 98 %  BP: 120/80  BP Location: Right arm  Patient Position: Sitting  Height and Weight  Height: 5' 10" (177.8 cm)  Weight: 90.5 kg (199 lb 8.3 oz)  BSA (Calculated - sq m): 2.11 sq meters  BMI (Calculated): 28.6  Weight in (lb) to have BMI = 25: 173.9]     Body mass index is 28.63 kg/m².    Physical Exam  Constitutional:       Appearance: He is well-developed.   HENT:      Head: Normocephalic.   Eyes:      Pupils: Pupils are equal, round, and reactive to light.   Neck:      Thyroid: No thyromegaly.   Cardiovascular:      Rate and Rhythm: Normal rate and regular rhythm.      Heart sounds: No murmur heard.    No friction rub. No gallop.   Pulmonary:      Effort: Pulmonary effort is normal.      Breath sounds: Normal breath " sounds.   Abdominal:      General: Bowel sounds are normal.      Palpations: Abdomen is soft.   Musculoskeletal:         General: Normal range of motion.      Cervical back: Normal range of motion.   Skin:     General: Skin is warm and dry.   Neurological:      Mental Status: He is alert and oriented to person, place, and time.      Sensory: No sensory deficit.   Psychiatric:         Behavior: Behavior normal.        Laboratory:  CBC:  No results for input(s): WBC, RBC, HGB, HCT, PLT, MCV, MCH, MCHC in the last 2160 hours.  CMP:  No results for input(s): GLU, CALCIUM, ALBUMIN, PROT, NA, K, CO2, CL, BUN, ALKPHOS, ALT, AST, BILITOT in the last 2160 hours.    Invalid input(s): CREATININ  URINALYSIS:  No results for input(s): COLORU, CLARITYU, SPECGRAV, PHUR, PROTEINUA, GLUCOSEU, BILIRUBINCON, BLOODU, WBCU, RBCU, BACTERIA, MUCUS, NITRITE, LEUKOCYTESUR, UROBILINOGEN, HYALINECASTS in the last 2160 hours.   LIPIDS:  No results for input(s): TSH, HDL, CHOL, TRIG, LDLCALC, CHOLHDL, NONHDLCHOL, TOTALCHOLEST in the last 2160 hours.  TSH:  No results for input(s): TSH in the last 2160 hours.  A1C:  No results for input(s): HGBA1C in the last 2160 hours.    Radiology:        Assessment:     Erik Rodriguez is a 68 y.o.male with:    Mixed hyperlipidemia  -     Comprehensive Metabolic Panel; Future; Expected date: 03/06/2023  -     Lipid Panel; Future; Expected date: 03/06/2023    h/o TIA (transient ischemic attack)    Seasonal allergic rhinitis due to pollen  -     CBC Auto Differential; Future; Expected date: 03/06/2023    Sinus congestion  -     fluticasone propionate (FLONASE) 50 mcg/actuation nasal spray; 1 spray (50 mcg total) by Each Nostril route 2 (two) times daily as needed for Rhinitis or Allergies.  Dispense: 48 g; Refill: 3    Situational anxiety    Osteoarthritis of left hip, unspecified osteoarthritis type    Prostate cancer screening  -     PSA, Screening; Future; Expected date: 05/01/2023                Plan:      Problem List Items Addressed This Visit          Neuro    h/o TIA (transient ischemic attack)    Overview     Cont plavix , lipitor labs due in may             Psychiatric    Situational anxiety    Overview     Xanax he is rarely using  reviewed             ENT    Sinus congestion    Seasonal allergic rhinitis due to pollen    Overview     Stable on current reigmen cont same             Cardiac/Vascular    Hyperlipidemia - Primary    Overview     lipitor 20mg   Labs due on may 23            Orthopedic    Osteoarthritis of left hip    Overview     Dr barreto managing will be getting hip replacement soon           Other Visit Diagnoses       Prostate cancer screening                As above, continue current medications and maintain follow up with specialists.  Return to clinic in 6 months.      Frederick W Dantagnan [facility]

## 2023-03-21 ENCOUNTER — PATIENT MESSAGE (OUTPATIENT)
Dept: PRIMARY CARE CLINIC | Facility: CLINIC | Age: 68
End: 2023-03-21
Payer: MEDICARE

## 2023-03-21 RX ORDER — CLOPIDOGREL BISULFATE 75 MG/1
75 TABLET ORAL DAILY
Qty: 90 TABLET | Refills: 3 | Status: SHIPPED | OUTPATIENT
Start: 2023-03-21

## 2023-04-30 ENCOUNTER — PATIENT MESSAGE (OUTPATIENT)
Dept: PRIMARY CARE CLINIC | Facility: CLINIC | Age: 68
End: 2023-04-30
Payer: MEDICARE

## 2023-04-30 DIAGNOSIS — Z12.11 SCREENING FOR COLON CANCER: ICD-10-CM

## 2023-05-01 ENCOUNTER — LAB VISIT (OUTPATIENT)
Dept: LAB | Facility: HOSPITAL | Age: 68
End: 2023-05-01
Attending: INTERNAL MEDICINE
Payer: MEDICARE

## 2023-05-01 DIAGNOSIS — E78.2 MIXED HYPERLIPIDEMIA: ICD-10-CM

## 2023-05-01 DIAGNOSIS — J30.1 SEASONAL ALLERGIC RHINITIS DUE TO POLLEN: ICD-10-CM

## 2023-05-01 DIAGNOSIS — Z12.5 PROSTATE CANCER SCREENING: ICD-10-CM

## 2023-05-01 LAB
ALBUMIN SERPL BCP-MCNC: 3.9 G/DL (ref 3.5–5.2)
ALP SERPL-CCNC: 68 U/L (ref 55–135)
ALT SERPL W/O P-5'-P-CCNC: 30 U/L (ref 10–44)
ANION GAP SERPL CALC-SCNC: 9 MMOL/L (ref 8–16)
AST SERPL-CCNC: 24 U/L (ref 10–40)
BASOPHILS # BLD AUTO: 0.04 K/UL (ref 0–0.2)
BASOPHILS NFR BLD: 0.8 % (ref 0–1.9)
BILIRUB SERPL-MCNC: 0.6 MG/DL (ref 0.1–1)
BUN SERPL-MCNC: 15 MG/DL (ref 8–23)
CALCIUM SERPL-MCNC: 9.2 MG/DL (ref 8.7–10.5)
CHLORIDE SERPL-SCNC: 107 MMOL/L (ref 95–110)
CHOLEST SERPL-MCNC: 136 MG/DL (ref 120–199)
CHOLEST/HDLC SERPL: 2.7 {RATIO} (ref 2–5)
CO2 SERPL-SCNC: 27 MMOL/L (ref 23–29)
COMPLEXED PSA SERPL-MCNC: 1.9 NG/ML (ref 0–4)
CREAT SERPL-MCNC: 0.8 MG/DL (ref 0.5–1.4)
DIFFERENTIAL METHOD: ABNORMAL
EOSINOPHIL # BLD AUTO: 0.2 K/UL (ref 0–0.5)
EOSINOPHIL NFR BLD: 3.2 % (ref 0–8)
ERYTHROCYTE [DISTWIDTH] IN BLOOD BY AUTOMATED COUNT: 13.6 % (ref 11.5–14.5)
EST. GFR  (NO RACE VARIABLE): >60 ML/MIN/1.73 M^2
GLUCOSE SERPL-MCNC: 96 MG/DL (ref 70–110)
HCT VFR BLD AUTO: 48.1 % (ref 40–54)
HDLC SERPL-MCNC: 50 MG/DL (ref 40–75)
HDLC SERPL: 36.8 % (ref 20–50)
HGB BLD-MCNC: 15.6 G/DL (ref 14–18)
IMM GRANULOCYTES # BLD AUTO: 0.02 K/UL (ref 0–0.04)
IMM GRANULOCYTES NFR BLD AUTO: 0.4 % (ref 0–0.5)
LDLC SERPL CALC-MCNC: 67.6 MG/DL (ref 63–159)
LYMPHOCYTES # BLD AUTO: 1.1 K/UL (ref 1–4.8)
LYMPHOCYTES NFR BLD: 20.8 % (ref 18–48)
MCH RBC QN AUTO: 29.9 PG (ref 27–31)
MCHC RBC AUTO-ENTMCNC: 32.4 G/DL (ref 32–36)
MCV RBC AUTO: 92 FL (ref 82–98)
MONOCYTES # BLD AUTO: 0.5 K/UL (ref 0.3–1)
MONOCYTES NFR BLD: 10.2 % (ref 4–15)
NEUTROPHILS # BLD AUTO: 3.4 K/UL (ref 1.8–7.7)
NEUTROPHILS NFR BLD: 64.6 % (ref 38–73)
NONHDLC SERPL-MCNC: 86 MG/DL
NRBC BLD-RTO: 0 /100 WBC
PLATELET # BLD AUTO: 235 K/UL (ref 150–450)
PMV BLD AUTO: 9 FL (ref 9.2–12.9)
POTASSIUM SERPL-SCNC: 4 MMOL/L (ref 3.5–5.1)
PROT SERPL-MCNC: 7 G/DL (ref 6–8.4)
RBC # BLD AUTO: 5.21 M/UL (ref 4.6–6.2)
SODIUM SERPL-SCNC: 143 MMOL/L (ref 136–145)
TRIGL SERPL-MCNC: 92 MG/DL (ref 30–150)
WBC # BLD AUTO: 5.29 K/UL (ref 3.9–12.7)

## 2023-05-01 PROCEDURE — 85025 COMPLETE CBC W/AUTO DIFF WBC: CPT | Performed by: INTERNAL MEDICINE

## 2023-05-01 PROCEDURE — 80053 COMPREHEN METABOLIC PANEL: CPT | Performed by: INTERNAL MEDICINE

## 2023-05-01 PROCEDURE — 36415 COLL VENOUS BLD VENIPUNCTURE: CPT | Performed by: INTERNAL MEDICINE

## 2023-05-01 PROCEDURE — 84153 ASSAY OF PSA TOTAL: CPT | Performed by: INTERNAL MEDICINE

## 2023-05-01 PROCEDURE — 80061 LIPID PANEL: CPT | Performed by: INTERNAL MEDICINE

## 2023-05-09 ENCOUNTER — PES CALL (OUTPATIENT)
Dept: ADMINISTRATIVE | Facility: CLINIC | Age: 68
End: 2023-05-09
Payer: MEDICARE

## 2023-05-10 ENCOUNTER — PATIENT MESSAGE (OUTPATIENT)
Dept: PRIMARY CARE CLINIC | Facility: CLINIC | Age: 68
End: 2023-05-10
Payer: MEDICARE

## 2023-05-17 ENCOUNTER — PATIENT MESSAGE (OUTPATIENT)
Dept: PRIMARY CARE CLINIC | Facility: CLINIC | Age: 68
End: 2023-05-17
Payer: MEDICARE

## 2023-05-22 ENCOUNTER — OFFICE VISIT (OUTPATIENT)
Dept: PRIMARY CARE CLINIC | Facility: CLINIC | Age: 68
End: 2023-05-22
Payer: MEDICARE

## 2023-05-22 VITALS
RESPIRATION RATE: 18 BRPM | DIASTOLIC BLOOD PRESSURE: 80 MMHG | OXYGEN SATURATION: 99 % | SYSTOLIC BLOOD PRESSURE: 110 MMHG | WEIGHT: 196.88 LBS | TEMPERATURE: 97 F | HEART RATE: 63 BPM | HEIGHT: 70 IN | BODY MASS INDEX: 28.18 KG/M2

## 2023-05-22 DIAGNOSIS — M16.12 OSTEOARTHRITIS OF LEFT HIP, UNSPECIFIED OSTEOARTHRITIS TYPE: ICD-10-CM

## 2023-05-22 DIAGNOSIS — G45.9 TIA (TRANSIENT ISCHEMIC ATTACK): ICD-10-CM

## 2023-05-22 DIAGNOSIS — Z01.818 PREOP EXAM FOR INTERNAL MEDICINE: Primary | ICD-10-CM

## 2023-05-22 DIAGNOSIS — E78.2 MIXED HYPERLIPIDEMIA: ICD-10-CM

## 2023-05-22 PROCEDURE — 93010 ELECTROCARDIOGRAM REPORT: CPT | Mod: S$PBB,,, | Performed by: INTERNAL MEDICINE

## 2023-05-22 PROCEDURE — 93010 EKG 12-LEAD: ICD-10-PCS | Mod: S$PBB,,, | Performed by: INTERNAL MEDICINE

## 2023-05-22 PROCEDURE — 93005 ELECTROCARDIOGRAM TRACING: CPT | Mod: PBBFAC | Performed by: INTERNAL MEDICINE

## 2023-05-22 PROCEDURE — 99214 PR OFFICE/OUTPT VISIT, EST, LEVL IV, 30-39 MIN: ICD-10-PCS | Mod: S$PBB,,, | Performed by: INTERNAL MEDICINE

## 2023-05-22 PROCEDURE — 99214 OFFICE O/P EST MOD 30 MIN: CPT | Mod: PBBFAC | Performed by: INTERNAL MEDICINE

## 2023-05-22 PROCEDURE — 99999 PR PBB SHADOW E&M-EST. PATIENT-LVL IV: CPT | Mod: PBBFAC,,, | Performed by: INTERNAL MEDICINE

## 2023-05-22 PROCEDURE — 99214 OFFICE O/P EST MOD 30 MIN: CPT | Mod: S$PBB,,, | Performed by: INTERNAL MEDICINE

## 2023-05-22 PROCEDURE — 99999 PR PBB SHADOW E&M-EST. PATIENT-LVL IV: ICD-10-PCS | Mod: PBBFAC,,, | Performed by: INTERNAL MEDICINE

## 2023-05-22 RX ORDER — ATORVASTATIN CALCIUM 20 MG/1
20 TABLET, FILM COATED ORAL NIGHTLY
Qty: 90 TABLET | Refills: 3 | Status: SHIPPED | OUTPATIENT
Start: 2023-05-22

## 2023-05-22 NOTE — PROGRESS NOTES
[facility] Note    Chief Complaint      Chief Complaint   Patient presents with    Pre-op Exam       History of Present Illness      Erik Rodriguez is a 68 y.o. male who presents today for   Chief Complaint   Patient presents with    Pre-op Exam   .  Patient comes to appointment here for preop assessment for left thr with dr Kirkpatrick at [facility] . He has good functional status can go up flight of steps with no chest pain or sanchez . He just had full labs done  this month all look great .     HPI    No problem-specific Assessment & Plan notes found for this encounter.       Problem List Items Addressed This Visit          Neuro    h/o TIA (transient ischemic attack)    Overview     Cont plavix , lipitor cehck ekg               Cardiac/Vascular    Hyperlipidemia    Overview     lipitor 20mg   Labs due on may 23              Orthopedic    Osteoarthritis of left hip    Overview     Dr kirkpatrick managing will be getting hip replacement 5/29/23              Other    Preop exam for internal medicine - Primary    Overview     ekg noted no st t changes   Reviewed all labs   Cleared for procedure with low risk cardiac complications   plavix held for 7 days prior to procedure                Past Medical History:  Past Medical History:   Diagnosis Date    Allergy     Fuchs' corneal dystrophy of both eyes     Hyperlipidemia     Stroke     TIA (transient ischemic attack) 07/2010       Past Surgical History:  Past Surgical History:   Procedure Laterality Date    INJECTION OF STEROID  01/2020    pat reported    KNEE ARTHROSCOPY Left 01/2013    KNEE CARTILAGE SURGERY      left knee 09/2008; right knee 01/2015    NASAL SEPTUM SURGERY  02/2016    SKIN BIOPSY  02/2021    from head-benign-pt reported    STEROID INJECTION HIP Left 02/22/2022    pt reported    TOTAL HIP ARTHROPLASTY Right 04/2013    pat reported / Done in [place]       Family History:  family history includes Arthritis in his paternal grandfather; Cancer in  his father, maternal grandmother, and paternal grandmother; Heart disease in his paternal grandfather; Heart failure in his mother; Kidney failure in his mother; Stroke in his maternal grandfather.     Social History:  Social History     Socioeconomic History    Marital status:    Tobacco Use    Smoking status: Never    Smokeless tobacco: Never   Substance and Sexual Activity    Alcohol use: Yes     Comment: occasionally    Drug use: Never    Sexual activity: Yes       Review of Systems:   Review of Systems   Constitutional:  Negative for fever and weight loss.   HENT:  Negative for congestion, hearing loss and sore throat.    Eyes:  Negative for blurred vision.   Respiratory:  Negative for cough and shortness of breath.    Cardiovascular:  Negative for chest pain, palpitations, claudication and leg swelling.   Gastrointestinal:  Negative for abdominal pain, constipation, diarrhea and heartburn.   Genitourinary:  Negative for dysuria.   Musculoskeletal:  Positive for joint pain. Negative for back pain and myalgias.   Skin:  Negative for rash.   Neurological:  Negative for focal weakness and headaches.   Psychiatric/Behavioral:  Negative for depression and suicidal ideas. The patient is not nervous/anxious.       Medications:  Outpatient Encounter Medications as of 5/22/2023   Medication Sig Note Dispense Refill    acetaminophen (TYLENOL) 500 MG tablet Take 500 mg by mouth as needed for Pain.       clopidogreL (PLAVIX) 75 mg tablet Take 1 tablet (75 mg total) by mouth once daily.  90 tablet 3    fluticasone propionate (FLONASE) 50 mcg/actuation nasal spray 1 spray (50 mcg total) by Each Nostril route 2 (two) times daily as needed for Rhinitis or Allergies.  48 g 3    ibuprofen (ADVIL,MOTRIN) 200 MG tablet Take 200 mg by mouth 2 (two) times daily as needed.  1/12/2018: Received from: External Pharmacy      montelukast (SINGULAIR) 10 mg tablet TAKE 1 TABLET(10 MG) BY MOUTH EVERY NIGHT  90 tablet 3     "multivitamin capsule Take 1 capsule by mouth once daily.       olopatadine (PATANOL) 0.1 % ophthalmic solution Place 1 drop into both eyes 2 (two) times daily.  5 mL 5    psyllium (METAMUCIL) powder Take by mouth once daily.       UNABLE TO FIND Allergy shot at Dr. Purvis every week.       [DISCONTINUED] atorvastatin (LIPITOR) 20 MG tablet Take 1 tablet (20 mg total) by mouth every evening.  90 tablet 3    ALPRAZolam (XANAX) 0.25 MG tablet Take 1 tablet (0.25 mg total) by mouth 2 (two) times daily as needed for Anxiety. (Patient not taking: Reported on 5/22/2023)  30 tablet 1    atorvastatin (LIPITOR) 20 MG tablet Take 1 tablet (20 mg total) by mouth every evening.  90 tablet 3     No facility-administered encounter medications on file as of 5/22/2023.       Allergies:  Review of patient's allergies indicates:  No Known Allergies      Physical Exam      Vitals:    05/22/23 1111   BP: 110/80   Pulse: 63   Resp: 18   Temp: 97 °F (36.1 °C)        Vital Signs  Temp: 97 °F (36.1 °C)  Temp Source: Oral  Pulse: 63  Resp: 18  SpO2: 99 %  BP: 110/80  BP Location: Right arm  Patient Position: Sitting  Pain Score: 0-No pain  Height and Weight  Height: 5' 10" (177.8 cm)  Weight: 89.3 kg (196 lb 13.9 oz)  BSA (Calculated - sq m): 2.1 sq meters  BMI (Calculated): 28.2  Weight in (lb) to have BMI = 25: 173.9]     Body mass index is 28.25 kg/m².    Physical Exam  Constitutional:       Appearance: He is well-developed.   HENT:      Head: Normocephalic.   Eyes:      Pupils: Pupils are equal, round, and reactive to light.   Neck:      Thyroid: No thyromegaly.   Cardiovascular:      Rate and Rhythm: Normal rate and regular rhythm.      Heart sounds: No murmur heard.    No friction rub. No gallop.   Pulmonary:      Effort: Pulmonary effort is normal.      Breath sounds: Normal breath sounds.   Abdominal:      General: Bowel sounds are normal.      Palpations: Abdomen is soft.   Musculoskeletal:      Cervical back: Normal range of " motion.      Left hip: Tenderness present. Decreased range of motion.   Skin:     General: Skin is warm and dry.   Neurological:      Mental Status: He is alert and oriented to person, place, and time.      Sensory: No sensory deficit.   Psychiatric:         Behavior: Behavior normal.        Laboratory:  CBC:  Recent Labs   Lab Result Units 05/01/23  0903   WBC K/uL 5.29   RBC M/uL 5.21   Hemoglobin g/dL 15.6   Hematocrit % 48.1   Platelets K/uL 235   MCV fL 92   MCH pg 29.9   MCHC g/dL 32.4     CMP:  Recent Labs   Lab Result Units 05/01/23  0903   Glucose mg/dL 96   Calcium mg/dL 9.2   Albumin g/dL 3.9   Total Protein g/dL 7.0   Sodium mmol/L 143   Potassium mmol/L 4.0   CO2 mmol/L 27   Chloride mmol/L 107   BUN mg/dL 15   Alkaline Phosphatase U/L 68   ALT U/L 30   AST U/L 24   Total Bilirubin mg/dL 0.6     URINALYSIS:  No results for input(s): COLORU, CLARITYU, SPECGRAV, PHUR, PROTEINUA, GLUCOSEU, BILIRUBINCON, BLOODU, WBCU, RBCU, BACTERIA, MUCUS, NITRITE, LEUKOCYTESUR, UROBILINOGEN, HYALINECASTS in the last 2160 hours.   LIPIDS:  Recent Labs   Lab Result Units 05/01/23  0903   HDL mg/dL 50   Cholesterol mg/dL 136   Triglycerides mg/dL 92   LDL Cholesterol mg/dL 67.6   HDL/Cholesterol Ratio % 36.8   Non-HDL Cholesterol mg/dL 86   Total Cholesterol/HDL Ratio  2.7     TSH:  No results for input(s): TSH in the last 2160 hours.  A1C:  No results for input(s): HGBA1C in the last 2160 hours.    Radiology:        Assessment:     Erik Rodriguez is a 68 y.o.male with:    Preop exam for internal medicine    Osteoarthritis of left hip, unspecified osteoarthritis type    h/o TIA (transient ischemic attack)  -     IN OFFICE EKG 12-LEAD (to Muse)    Mixed hyperlipidemia  -     atorvastatin (LIPITOR) 20 MG tablet; Take 1 tablet (20 mg total) by mouth every evening.  Dispense: 90 tablet; Refill: 3                Plan:     Problem List Items Addressed This Visit          Neuro    h/o TIA (transient ischemic attack)    Overview      Cont plavix , lipitor cehck ekg               Cardiac/Vascular    Hyperlipidemia    Overview     lipitor 20mg   Labs due on may 23              Orthopedic    Osteoarthritis of left hip    Overview     Dr barreto managing will be getting hip replacement 5/29/23              Other    Preop exam for internal medicine - Primary    Overview     ekg noted no st t changes   Reviewed all labs   Cleared for procedure with low risk cardiac complications   plavix held for 7 days prior to procedure               As above, continue current medications and maintain follow up with specialists.  Return to clinic in 6 months.      Frederick W Dantagnan [facility]

## 2023-06-06 LAB — HEMOCCULT STL QL IA: NEGATIVE

## 2023-08-25 ENCOUNTER — OFFICE VISIT (OUTPATIENT)
Dept: PRIMARY CARE CLINIC | Facility: CLINIC | Age: 68
End: 2023-08-25
Payer: MEDICARE

## 2023-08-25 VITALS
DIASTOLIC BLOOD PRESSURE: 76 MMHG | HEART RATE: 84 BPM | WEIGHT: 196.88 LBS | SYSTOLIC BLOOD PRESSURE: 124 MMHG | BODY MASS INDEX: 28.25 KG/M2 | OXYGEN SATURATION: 97 %

## 2023-08-25 DIAGNOSIS — Z01.818 PREOP EXAM FOR INTERNAL MEDICINE: ICD-10-CM

## 2023-08-25 DIAGNOSIS — R09.81 SINUS CONGESTION: ICD-10-CM

## 2023-08-25 DIAGNOSIS — Z86.73 HISTORY OF TIA (TRANSIENT ISCHEMIC ATTACK): ICD-10-CM

## 2023-08-25 DIAGNOSIS — M17.11 PRIMARY OSTEOARTHRITIS OF RIGHT KNEE: Primary | ICD-10-CM

## 2023-08-25 PROCEDURE — 93010 EKG 12-LEAD: ICD-10-PCS | Mod: S$PBB,,, | Performed by: INTERNAL MEDICINE

## 2023-08-25 PROCEDURE — 93005 ELECTROCARDIOGRAM TRACING: CPT | Mod: PBBFAC | Performed by: INTERNAL MEDICINE

## 2023-08-25 PROCEDURE — 99999 PR PBB SHADOW E&M-EST. PATIENT-LVL III: CPT | Mod: PBBFAC,,, | Performed by: INTERNAL MEDICINE

## 2023-08-25 PROCEDURE — 99214 PR OFFICE/OUTPT VISIT, EST, LEVL IV, 30-39 MIN: ICD-10-PCS | Mod: S$PBB,,, | Performed by: INTERNAL MEDICINE

## 2023-08-25 PROCEDURE — 93010 ELECTROCARDIOGRAM REPORT: CPT | Mod: S$PBB,,, | Performed by: INTERNAL MEDICINE

## 2023-08-25 PROCEDURE — 99999 PR PBB SHADOW E&M-EST. PATIENT-LVL III: ICD-10-PCS | Mod: PBBFAC,,, | Performed by: INTERNAL MEDICINE

## 2023-08-25 PROCEDURE — 99213 OFFICE O/P EST LOW 20 MIN: CPT | Mod: PBBFAC | Performed by: INTERNAL MEDICINE

## 2023-08-25 PROCEDURE — 99214 OFFICE O/P EST MOD 30 MIN: CPT | Mod: S$PBB,,, | Performed by: INTERNAL MEDICINE

## 2023-08-25 RX ORDER — OMEPRAZOLE 20 MG/1
20 CAPSULE, DELAYED RELEASE ORAL DAILY
COMMUNITY
End: 2024-01-11

## 2023-08-25 RX ORDER — OLOPATADINE HYDROCHLORIDE 1 MG/ML
1 SOLUTION/ DROPS OPHTHALMIC 2 TIMES DAILY
Qty: 15 ML | Refills: 3 | Status: SHIPPED | OUTPATIENT
Start: 2023-08-25 | End: 2024-01-11

## 2023-08-25 NOTE — PROGRESS NOTES
[facility] Note    Chief Complaint      Chief Complaint   Patient presents with    Follow-up    Pre-op Exam       History of Present Illness      Erik Rodriguez is a 68 y.o. male who presents today for   Chief Complaint   Patient presents with    Follow-up    Pre-op Exam   .  Patient comes to appointment here for preop int med for clearance for tkr of right knee with Dr barreto 9/11/2023. He has good functional capacity can go up flight of steps with no cp or sob . Just had left hip replacement done in may .    HPI    No problem-specific Assessment & Plan notes found for this encounter.       Problem List Items Addressed This Visit          Neuro    History of TIA (transient ischemic attack)    Overview     ekg cont plavix             ENT    Sinus congestion       Orthopedic    Primary osteoarthritis of right knee - Primary    Overview     For tkr with dr barreto               Other    Preop exam for internal medicine    Overview     ekg noted no st t changes   Reviewed all labs   Cleared for procedure with low risk cardiac complications   plavix held for 7 days prior to procedure              Past Medical History:  Past Medical History:   Diagnosis Date    Allergy     Fuchs' corneal dystrophy of both eyes     Hyperlipidemia     Stroke     TIA (transient ischemic attack) 07/2010       Past Surgical History:  Past Surgical History:   Procedure Laterality Date    INJECTION OF STEROID  01/2020    pat reported    KNEE ARTHROSCOPY Left 01/2013    KNEE CARTILAGE SURGERY      left knee 09/2008; right knee 01/2015    NASAL SEPTUM SURGERY  02/2016    SKIN BIOPSY  02/2021    from head-benign-pt reported    STEROID INJECTION HIP Left 02/22/2022    pt reported    TOTAL HIP ARTHROPLASTY Right 04/2013    pat reported / Done in [place]    TOTAL HIP ARTHROPLASTY Left 05/29/2023       Family History:  family history includes Arthritis in his paternal grandfather; Cancer in his father, maternal grandmother, and  paternal grandmother; Heart disease in his paternal grandfather; Heart failure in his mother; Kidney failure in his mother; Stroke in his maternal grandfather.     Social History:  Social History     Socioeconomic History    Marital status:    Tobacco Use    Smoking status: Never    Smokeless tobacco: Never   Substance and Sexual Activity    Alcohol use: Yes     Comment: occasionally    Drug use: Never    Sexual activity: Yes       Review of Systems:   Review of Systems   Constitutional:  Negative for fever and weight loss.   HENT:  Negative for congestion, hearing loss and sore throat.    Eyes:  Negative for blurred vision.   Respiratory:  Negative for cough and shortness of breath.    Cardiovascular:  Negative for chest pain, palpitations, claudication and leg swelling.   Gastrointestinal:  Negative for abdominal pain, constipation, diarrhea and heartburn.   Genitourinary:  Negative for dysuria.   Musculoskeletal:  Positive for joint pain. Negative for back pain and myalgias.   Skin:  Negative for rash.   Neurological:  Negative for focal weakness and headaches.   Psychiatric/Behavioral:  Negative for depression and suicidal ideas. The patient is not nervous/anxious.         Medications:  Outpatient Encounter Medications as of 8/25/2023   Medication Sig Note Dispense Refill    acetaminophen (TYLENOL) 500 MG tablet Take 500 mg by mouth as needed for Pain.       atorvastatin (LIPITOR) 20 MG tablet Take 1 tablet (20 mg total) by mouth every evening.  90 tablet 3    clopidogreL (PLAVIX) 75 mg tablet Take 1 tablet (75 mg total) by mouth once daily.  90 tablet 3    fluticasone propionate (FLONASE) 50 mcg/actuation nasal spray 1 spray (50 mcg total) by Each Nostril route 2 (two) times daily as needed for Rhinitis or Allergies.  48 g 3    ibuprofen (ADVIL,MOTRIN) 200 MG tablet Take 200 mg by mouth 2 (two) times daily as needed.  1/12/2018: Received from: External Pharmacy      montelukast (SINGULAIR) 10 mg tablet  TAKE 1 TABLET(10 MG) BY MOUTH EVERY NIGHT  90 tablet 3    multivitamin capsule Take 1 capsule by mouth once daily.       omeprazole (PRILOSEC) 20 MG capsule Take 20 mg by mouth once daily.       psyllium (METAMUCIL) powder Take 1 packet by mouth as needed.       [DISCONTINUED] olopatadine (PATANOL) 0.1 % ophthalmic solution Place 1 drop into both eyes 2 (two) times daily.  5 mL 5    olopatadine (PATANOL) 0.1 % ophthalmic solution Place 1 drop into both eyes 2 (two) times daily.  15 mL 3    [DISCONTINUED] ALPRAZolam (XANAX) 0.25 MG tablet Take 1 tablet (0.25 mg total) by mouth 2 (two) times daily as needed for Anxiety. (Patient not taking: Reported on 5/22/2023)  30 tablet 1    [DISCONTINUED] UNABLE TO FIND Allergy shot at Dr. Purvis every week.        No facility-administered encounter medications on file as of 8/25/2023.       Allergies:  Review of patient's allergies indicates:  No Known Allergies      Physical Exam      Vitals:    08/25/23 1105   BP: 124/76   Pulse: 84        Vital Signs  Pulse: 84  SpO2: 97 %  BP: 124/76  BP Location: Left arm  Pain Score:   5  Pain Loc: Knee  Height and Weight  Weight: 89.3 kg (196 lb 13.9 oz)]     Body mass index is 28.25 kg/m².    Physical Exam  Constitutional:       Appearance: He is well-developed.   HENT:      Head: Normocephalic.   Eyes:      Pupils: Pupils are equal, round, and reactive to light.   Neck:      Thyroid: No thyromegaly.   Cardiovascular:      Rate and Rhythm: Normal rate and regular rhythm.      Heart sounds: No murmur heard.     No friction rub. No gallop.   Pulmonary:      Effort: Pulmonary effort is normal.      Breath sounds: Normal breath sounds.   Abdominal:      General: Bowel sounds are normal.      Palpations: Abdomen is soft.   Musculoskeletal:         General: Normal range of motion.      Cervical back: Normal range of motion.      Right knee: Tenderness present.      Left knee: Tenderness present.   Skin:     General: Skin is warm and dry.  "  Neurological:      Mental Status: He is alert and oriented to person, place, and time.      Sensory: No sensory deficit.   Psychiatric:         Behavior: Behavior normal.          Laboratory:  CBC:  No results for input(s): "WBC", "RBC", "HGB", "HCT", "PLT", "MCV", "MCH", "MCHC" in the last 2160 hours.  CMP:  No results for input(s): "GLU", "CALCIUM", "ALBUMIN", "PROT", "NA", "K", "CO2", "CL", "BUN", "ALKPHOS", "ALT", "AST", "BILITOT" in the last 2160 hours.    Invalid input(s): "CREATININ"  URINALYSIS:  No results for input(s): "COLORU", "CLARITYU", "SPECGRAV", "PHUR", "PROTEINUA", "GLUCOSEU", "BILIRUBINCON", "BLOODU", "WBCU", "RBCU", "BACTERIA", "MUCUS", "NITRITE", "LEUKOCYTESUR", "UROBILINOGEN", "HYALINECASTS" in the last 2160 hours.   LIPIDS:  No results for input(s): "TSH", "HDL", "CHOL", "TRIG", "LDLCALC", "CHOLHDL", "NONHDLCHOL", "TOTALCHOLEST" in the last 2160 hours.  TSH:  No results for input(s): "TSH" in the last 2160 hours.  A1C:  No results for input(s): "HGBA1C" in the last 2160 hours.    Radiology:        Assessment:     Erik Rodriguez is a 68 y.o.male with:    Primary osteoarthritis of right knee    History of TIA (transient ischemic attack)  -     IN OFFICE EKG 12-LEAD (to Muse)    Sinus congestion  -     olopatadine (PATANOL) 0.1 % ophthalmic solution; Place 1 drop into both eyes 2 (two) times daily.  Dispense: 15 mL; Refill: 3    Preop exam for internal medicine                Plan:     Problem List Items Addressed This Visit          Neuro    History of TIA (transient ischemic attack)    Overview     ekg cont plavix             ENT    Sinus congestion       Orthopedic    Primary osteoarthritis of right knee - Primary    Overview     For tkr with dr barreto               Other    Preop exam for internal medicine    Overview     ekg noted no st t changes   Reviewed all labs   Cleared for procedure with low risk cardiac complications   plavix held for 7 days prior to procedure             As above, " continue current medications and maintain follow up with specialists.  Return to clinic in 6 months.      Frederick W Dantagnan [facility]

## 2023-08-25 NOTE — LETTER
[facility]      Freddie Rodríguez MD              [facility]  [address] [facility] [id number]                                                                                                     Re: Name: Erik Rodriguez          : 1955        Erik Rodriguez has been examined by me on 2023, and appears to be physically well for       __x_ General Anesthesia       ___ Sports Participation     ___ School /      ___ Camp        Sincerely,      Freddie Rodríguez MD

## 2023-09-18 ENCOUNTER — PATIENT MESSAGE (OUTPATIENT)
Dept: PRIMARY CARE CLINIC | Facility: CLINIC | Age: 68
End: 2023-09-18
Payer: MEDICARE

## 2023-09-18 RX ORDER — MONTELUKAST SODIUM 10 MG/1
10 TABLET ORAL NIGHTLY
Qty: 90 TABLET | Refills: 3 | Status: SHIPPED | OUTPATIENT
Start: 2023-09-18

## 2023-09-18 NOTE — TELEPHONE ENCOUNTER
No care due was identified.  [facility] Embedded Care Due Messages. Reference number: [id number].   9/18/2023 8:08:43 AM CDT

## 2023-12-07 ENCOUNTER — OFFICE VISIT (OUTPATIENT)
Dept: PRIMARY CARE CLINIC | Facility: CLINIC | Age: 68
End: 2023-12-07
Payer: MEDICARE

## 2023-12-07 VITALS
HEIGHT: 70 IN | DIASTOLIC BLOOD PRESSURE: 90 MMHG | OXYGEN SATURATION: 98 % | BODY MASS INDEX: 27.4 KG/M2 | SYSTOLIC BLOOD PRESSURE: 138 MMHG | HEART RATE: 71 BPM | WEIGHT: 191.38 LBS

## 2023-12-07 DIAGNOSIS — Z79.01 CHRONIC ANTICOAGULATION: Primary | ICD-10-CM

## 2023-12-07 DIAGNOSIS — M17.12 PRIMARY OSTEOARTHRITIS OF LEFT KNEE: ICD-10-CM

## 2023-12-07 DIAGNOSIS — Z86.73 HISTORY OF TIA (TRANSIENT ISCHEMIC ATTACK): ICD-10-CM

## 2023-12-07 DIAGNOSIS — E78.2 MIXED HYPERLIPIDEMIA: ICD-10-CM

## 2023-12-07 PROCEDURE — 99214 PR OFFICE/OUTPT VISIT, EST, LEVL IV, 30-39 MIN: ICD-10-PCS | Mod: S$PBB,,, | Performed by: INTERNAL MEDICINE

## 2023-12-07 PROCEDURE — 99999 PR PBB SHADOW E&M-EST. PATIENT-LVL III: ICD-10-PCS | Mod: PBBFAC,,, | Performed by: INTERNAL MEDICINE

## 2023-12-07 PROCEDURE — 99999 PR PBB SHADOW E&M-EST. PATIENT-LVL III: CPT | Mod: PBBFAC,,, | Performed by: INTERNAL MEDICINE

## 2023-12-07 PROCEDURE — 99213 OFFICE O/P EST LOW 20 MIN: CPT | Mod: PBBFAC | Performed by: INTERNAL MEDICINE

## 2023-12-07 PROCEDURE — 99214 OFFICE O/P EST MOD 30 MIN: CPT | Mod: S$PBB,,, | Performed by: INTERNAL MEDICINE

## 2023-12-07 NOTE — PROGRESS NOTES
[facility] Note    Chief Complaint      Chief Complaint   Patient presents with    Follow-up     6 month        History of Present Illness      Erik Rodriguez is a 68 y.o. male who presents today for   Chief Complaint   Patient presents with    Follow-up     6 month    .  Patient comes to appointment here for 6 m checkup for chronic issues as below . He is currently dealing with some issues with sever left knee oa and pain . Is scheduled for tkt in 2024 . He will need preop clearance when it gets closer . He is in some pain which seems to be elevating his pressure .     HPI    No problem-specific Assessment & Plan notes found for this encounter.       Problem List Items Addressed This Visit          Neuro    History of TIA (transient ischemic attack)    Overview     ekg cont plavix             Cardiac/Vascular    Hyperlipidemia    Overview     lipitor 20mg   Labs due on may 23            Hematology    Chronic anticoagulation - Primary    Overview     stabel            Orthopedic    Primary osteoarthritis of left knee    Overview     Will schedule for preop next month   Dr estevan cottrell             Past Medical History:  Past Medical History:   Diagnosis Date    Allergy     Fuchs' corneal dystrophy of both eyes     Hyperlipidemia     Stroke     TIA (transient ischemic attack) 07/2010       Past Surgical History:  Past Surgical History:   Procedure Laterality Date    INJECTION OF STEROID  01/2020    pat reported    KNEE ARTHROSCOPY Left 01/2013    KNEE CARTILAGE SURGERY      left knee 09/2008; right knee 01/2015    NASAL SEPTUM SURGERY  02/2016    SKIN BIOPSY  02/2021    from head-benign-pt reported    STEROID INJECTION HIP Left 02/22/2022    pt reported    TOTAL HIP ARTHROPLASTY Right 04/2013    pat reported / Done in [place]    TOTAL HIP ARTHROPLASTY Left 05/29/2023    TOTAL KNEE ARTHROPLASTY Right 09/11/2023       Family History:  family history includes Arthritis in his paternal  grandfather; Cancer in his father, maternal grandmother, and paternal grandmother; Heart disease in his paternal grandfather; Heart failure in his mother; Kidney failure in his mother; Stroke in his maternal grandfather.     Social History:  Social History     Socioeconomic History    Marital status:    Tobacco Use    Smoking status: Never    Smokeless tobacco: Never   Substance and Sexual Activity    Alcohol use: Yes     Comment: occasionally    Drug use: Never    Sexual activity: Yes       Review of Systems:   Review of Systems   Constitutional:  Negative for fever and weight loss.   HENT:  Negative for congestion, hearing loss and sore throat.    Eyes:  Negative for blurred vision.   Respiratory:  Negative for cough and shortness of breath.    Cardiovascular:  Negative for chest pain, palpitations, claudication and leg swelling.   Gastrointestinal:  Negative for abdominal pain, constipation, diarrhea and heartburn.   Genitourinary:  Negative for dysuria.   Musculoskeletal:  Positive for joint pain. Negative for back pain and myalgias.   Skin:  Negative for rash.   Neurological:  Negative for focal weakness and headaches.   Psychiatric/Behavioral:  Negative for depression and suicidal ideas. The patient is not nervous/anxious.         Medications:  Outpatient Encounter Medications as of 12/7/2023   Medication Sig Note Dispense Refill    acetaminophen (TYLENOL) 500 MG tablet Take 500 mg by mouth as needed for Pain.       atorvastatin (LIPITOR) 20 MG tablet Take 1 tablet (20 mg total) by mouth every evening.  90 tablet 3    clopidogreL (PLAVIX) 75 mg tablet Take 1 tablet (75 mg total) by mouth once daily.  90 tablet 3    fluticasone propionate (FLONASE) 50 mcg/actuation nasal spray 1 spray (50 mcg total) by Each Nostril route 2 (two) times daily as needed for Rhinitis or Allergies.  48 g 3    ibuprofen (ADVIL,MOTRIN) 200 MG tablet Take 200 mg by mouth 2 (two) times daily as needed.  1/12/2018: Received from:  "External Pharmacy      montelukast (SINGULAIR) 10 mg tablet Take 1 tablet (10 mg total) by mouth every evening.  90 tablet 3    multivitamin capsule Take 1 capsule by mouth once daily.       olopatadine (PATANOL) 0.1 % ophthalmic solution Place 1 drop into both eyes 2 (two) times daily.  15 mL 3    omeprazole (PRILOSEC) 20 MG capsule Take 20 mg by mouth once daily.       psyllium (METAMUCIL) powder Take 1 packet by mouth as needed.        No facility-administered encounter medications on file as of 12/7/2023.       Allergies:  Review of patient's allergies indicates:  No Known Allergies      Physical Exam      Vitals:    12/07/23 1009   BP: (!) 138/90   Pulse: 71        Vital Signs  Pulse: 71  SpO2: 98 %  BP: (!) 138/90  BP Location: Right arm  Patient Position: Sitting  Pain Score: 0-No pain  Height and Weight  Height: 5' 10" (177.8 cm)  Weight: 86.8 kg (191 lb 5.8 oz)  BSA (Calculated - sq m): 2.07 sq meters  BMI (Calculated): 27.5  Weight in (lb) to have BMI = 25: 173.9]     Body mass index is 27.46 kg/m².    Physical Exam  Constitutional:       Appearance: He is well-developed.   HENT:      Head: Normocephalic.   Eyes:      Pupils: Pupils are equal, round, and reactive to light.   Neck:      Thyroid: No thyromegaly.   Cardiovascular:      Rate and Rhythm: Normal rate and regular rhythm.      Heart sounds: No murmur heard.     No friction rub. No gallop.   Pulmonary:      Effort: Pulmonary effort is normal.      Breath sounds: Normal breath sounds.   Abdominal:      General: Bowel sounds are normal.      Palpations: Abdomen is soft.   Musculoskeletal:         General: Normal range of motion.      Cervical back: Normal range of motion.   Skin:     General: Skin is warm and dry.   Neurological:      Mental Status: He is alert and oriented to person, place, and time.      Sensory: No sensory deficit.   Psychiatric:         Behavior: Behavior normal.          Laboratory:  CBC:  No results for input(s): "WBC", "RBC", " ""HGB", "HCT", "PLT", "MCV", "MCH", "MCHC" in the last 2160 hours.  CMP:  No results for input(s): "GLU", "CALCIUM", "ALBUMIN", "PROT", "NA", "K", "CO2", "CL", "BUN", "ALKPHOS", "ALT", "AST", "BILITOT" in the last 2160 hours.    Invalid input(s): "CREATININ"  URINALYSIS:  No results for input(s): "COLORU", "CLARITYU", "SPECGRAV", "PHUR", "PROTEINUA", "GLUCOSEU", "BILIRUBINCON", "BLOODU", "WBCU", "RBCU", "BACTERIA", "MUCUS", "NITRITE", "LEUKOCYTESUR", "UROBILINOGEN", "HYALINECASTS" in the last 2160 hours.   LIPIDS:  No results for input(s): "TSH", "HDL", "CHOL", "TRIG", "LDLCALC", "CHOLHDL", "NONHDLCHOL", "TOTALCHOLEST" in the last 2160 hours.  TSH:  No results for input(s): "TSH" in the last 2160 hours.  A1C:  No results for input(s): "HGBA1C" in the last 2160 hours.    Radiology:        Assessment:     Erik Rodriguez is a 68 y.o.male with:    Chronic anticoagulation    History of TIA (transient ischemic attack)    Mixed hyperlipidemia    Primary osteoarthritis of left knee                Plan:     Problem List Items Addressed This Visit          Neuro    History of TIA (transient ischemic attack)    Overview     ekg cont plavix             Cardiac/Vascular    Hyperlipidemia    Overview     lipitor 20mg   Labs due on may 23            Hematology    Chronic anticoagulation - Primary    Overview     stabel            Orthopedic    Primary osteoarthritis of left knee    Overview     Will schedule for preop next month   Dr estevan cottrell            As above, continue current medications and maintain follow up with specialists.  Return to clinic in 6 months.      Frederick W Dantagnan [facility]                  "

## 2024-01-11 ENCOUNTER — OFFICE VISIT (OUTPATIENT)
Dept: PRIMARY CARE CLINIC | Facility: CLINIC | Age: 69
End: 2024-01-11
Payer: MEDICARE

## 2024-01-11 ENCOUNTER — LAB VISIT (OUTPATIENT)
Dept: LAB | Facility: HOSPITAL | Age: 69
End: 2024-01-11
Attending: INTERNAL MEDICINE
Payer: MEDICARE

## 2024-01-11 VITALS
HEIGHT: 70 IN | BODY MASS INDEX: 28.15 KG/M2 | DIASTOLIC BLOOD PRESSURE: 84 MMHG | HEART RATE: 69 BPM | WEIGHT: 196.63 LBS | RESPIRATION RATE: 18 BRPM | SYSTOLIC BLOOD PRESSURE: 128 MMHG | OXYGEN SATURATION: 98 % | TEMPERATURE: 98 F

## 2024-01-11 DIAGNOSIS — Z00.00 ENCOUNTER FOR MEDICARE ANNUAL WELLNESS EXAM: ICD-10-CM

## 2024-01-11 DIAGNOSIS — R20.0 NUMBNESS AND TINGLING OF LOWER EXTREMITY: Primary | ICD-10-CM

## 2024-01-11 DIAGNOSIS — R20.0 NUMBNESS AND TINGLING OF LOWER EXTREMITY: ICD-10-CM

## 2024-01-11 DIAGNOSIS — R20.2 NUMBNESS AND TINGLING OF LOWER EXTREMITY: ICD-10-CM

## 2024-01-11 DIAGNOSIS — R20.2 NUMBNESS AND TINGLING OF LOWER EXTREMITY: Primary | ICD-10-CM

## 2024-01-11 LAB
ANION GAP SERPL CALC-SCNC: 7 MMOL/L (ref 8–16)
BUN SERPL-MCNC: 16 MG/DL (ref 8–23)
CALCIUM SERPL-MCNC: 9.7 MG/DL (ref 8.7–10.5)
CHLORIDE SERPL-SCNC: 102 MMOL/L (ref 95–110)
CO2 SERPL-SCNC: 28 MMOL/L (ref 23–29)
CREAT SERPL-MCNC: 0.8 MG/DL (ref 0.5–1.4)
EST. GFR  (NO RACE VARIABLE): >60 ML/MIN/1.73 M^2
GLUCOSE SERPL-MCNC: 68 MG/DL (ref 70–110)
MAGNESIUM SERPL-MCNC: 2.2 MG/DL (ref 1.6–2.6)
POTASSIUM SERPL-SCNC: 4.4 MMOL/L (ref 3.5–5.1)
SODIUM SERPL-SCNC: 137 MMOL/L (ref 136–145)

## 2024-01-11 PROCEDURE — 99213 OFFICE O/P EST LOW 20 MIN: CPT | Mod: PBBFAC | Performed by: INTERNAL MEDICINE

## 2024-01-11 PROCEDURE — 99999 PR PBB SHADOW E&M-EST. PATIENT-LVL III: CPT | Mod: PBBFAC,,, | Performed by: INTERNAL MEDICINE

## 2024-01-11 PROCEDURE — 83735 ASSAY OF MAGNESIUM: CPT | Performed by: INTERNAL MEDICINE

## 2024-01-11 PROCEDURE — 99213 OFFICE O/P EST LOW 20 MIN: CPT | Mod: S$PBB,,, | Performed by: INTERNAL MEDICINE

## 2024-01-11 PROCEDURE — 36415 COLL VENOUS BLD VENIPUNCTURE: CPT | Performed by: INTERNAL MEDICINE

## 2024-01-11 PROCEDURE — 80048 BASIC METABOLIC PNL TOTAL CA: CPT | Performed by: INTERNAL MEDICINE

## 2024-01-11 NOTE — PROGRESS NOTES
[facility] Note    Chief Complaint      Chief Complaint   Patient presents with    Tingling     Lower legs        History of Present Illness      Erik Rodriguez is a 69 y.o. male who presents today for   Chief Complaint   Patient presents with    Tingling     Lower legs    .  Patient comes to appointment here for acute visit related to above . He complains of some tingling in his lower legs along with leg cramps .     HPI    No problem-specific Assessment & Plan notes found for this encounter.       Problem List Items Addressed This Visit          Neuro    Numbness and tingling of lower extremity - Primary    Overview     Will check bmp and magnesium levels today              Past Medical History:  Past Medical History:   Diagnosis Date    Allergy     Fuchs' corneal dystrophy of both eyes     Hyperlipidemia     Stroke     TIA (transient ischemic attack) 07/2010       Past Surgical History:  Past Surgical History:   Procedure Laterality Date    INJECTION OF STEROID  01/2020    pat reported    KNEE ARTHROSCOPY Left 01/2013    KNEE CARTILAGE SURGERY      left knee 09/2008; right knee 01/2015    NASAL SEPTUM SURGERY  02/2016    SKIN BIOPSY  02/2021    from head-benign-pt reported    STEROID INJECTION HIP Left 02/22/2022    pt reported    TOTAL HIP ARTHROPLASTY Right 04/2013    pat reported / Done in [place]    TOTAL HIP ARTHROPLASTY Left 05/29/2023    TOTAL KNEE ARTHROPLASTY Right 09/11/2023       Family History:  family history includes Arthritis in his paternal grandfather; Cancer in his father, maternal grandmother, and paternal grandmother; Heart disease in his paternal grandfather; Heart failure in his mother; Kidney failure in his mother; Stroke in his maternal grandfather.     Social History:  Social History     Socioeconomic History    Marital status:    Tobacco Use    Smoking status: Never    Smokeless tobacco: Never   Substance and Sexual Activity    Alcohol use: Yes      Comment: occasionally    Drug use: Never    Sexual activity: Yes       Review of Systems:   Review of Systems   Constitutional:  Negative for fever and weight loss.   HENT:  Negative for congestion, hearing loss and sore throat.    Eyes:  Negative for blurred vision.   Respiratory:  Negative for cough and shortness of breath.    Cardiovascular:  Negative for chest pain, palpitations, claudication and leg swelling.   Gastrointestinal:  Negative for abdominal pain, constipation, diarrhea and heartburn.   Genitourinary:  Negative for dysuria.   Musculoskeletal:  Positive for back pain. Negative for myalgias.   Skin:  Negative for rash.   Neurological:  Positive for tingling. Negative for focal weakness and headaches.   Psychiatric/Behavioral:  Negative for depression and suicidal ideas. The patient is not nervous/anxious.         Medications:  Outpatient Encounter Medications as of 1/11/2024   Medication Sig Note Dispense Refill    acetaminophen (TYLENOL) 500 MG tablet Take 500 mg by mouth as needed for Pain.       atorvastatin (LIPITOR) 20 MG tablet Take 1 tablet (20 mg total) by mouth every evening.  90 tablet 3    clopidogreL (PLAVIX) 75 mg tablet Take 1 tablet (75 mg total) by mouth once daily.  90 tablet 3    fluticasone propionate (FLONASE) 50 mcg/actuation nasal spray 1 spray (50 mcg total) by Each Nostril route 2 (two) times daily as needed for Rhinitis or Allergies.  48 g 3    ibuprofen (ADVIL,MOTRIN) 200 MG tablet Take 200 mg by mouth 2 (two) times daily as needed.  1/12/2018: Received from: External Pharmacy      montelukast (SINGULAIR) 10 mg tablet Take 1 tablet (10 mg total) by mouth every evening.  90 tablet 3    multivitamin capsule Take 1 capsule by mouth once daily.       olopatadine (PATANOL) 0.1 % ophthalmic solution Place 1 drop into both eyes 2 (two) times daily.  15 mL 3    psyllium (METAMUCIL) powder Take 1 packet by mouth as needed.       [DISCONTINUED] omeprazole (PRILOSEC) 20 MG capsule Take  "20 mg by mouth once daily.        No facility-administered encounter medications on file as of 1/11/2024.       Allergies:  Review of patient's allergies indicates:  No Known Allergies      Physical Exam      Vitals:    01/11/24 1058   BP: 128/84   Pulse: 69   Resp: 18   Temp: 98 °F (36.7 °C)        Vital Signs  Temp: 98 °F (36.7 °C)  Temp Source: Oral  Pulse: 69  Resp: 18  SpO2: 98 %  BP: 128/84  BP Location: Right arm  Patient Position: Sitting  Pain Score: 0-No pain  Height and Weight  Height: 5' 10" (177.8 cm)  Weight: 89.2 kg (196 lb 10.4 oz)  BSA (Calculated - sq m): 2.1 sq meters  BMI (Calculated): 28.2  Weight in (lb) to have BMI = 25: 173.9]     Body mass index is 28.22 kg/m².    Physical Exam  Constitutional:       Appearance: He is well-developed.   HENT:      Head: Normocephalic.   Eyes:      Pupils: Pupils are equal, round, and reactive to light.   Neck:      Thyroid: No thyromegaly.   Cardiovascular:      Rate and Rhythm: Normal rate and regular rhythm.      Heart sounds: No murmur heard.     No friction rub. No gallop.   Pulmonary:      Effort: Pulmonary effort is normal.      Breath sounds: Normal breath sounds.   Abdominal:      General: Bowel sounds are normal.      Palpations: Abdomen is soft.   Musculoskeletal:         General: Normal range of motion.      Cervical back: Normal range of motion.   Skin:     General: Skin is warm and dry.   Neurological:      Mental Status: He is alert and oriented to person, place, and time.      Sensory: No sensory deficit.   Psychiatric:         Behavior: Behavior normal.          Laboratory:  CBC:  No results for input(s): "WBC", "RBC", "HGB", "HCT", "PLT", "MCV", "MCH", "MCHC" in the last 2160 hours.  CMP:  No results for input(s): "GLU", "CALCIUM", "ALBUMIN", "PROT", "NA", "K", "CO2", "CL", "BUN", "ALKPHOS", "ALT", "AST", "BILITOT" in the last 2160 hours.    Invalid input(s): "CREATININ"  URINALYSIS:  No results for input(s): "COLORU", "CLARITYU", "SPECGRAV", " ""PHUR", "PROTEINUA", "GLUCOSEU", "BILIRUBINCON", "BLOODU", "WBCU", "RBCU", "BACTERIA", "MUCUS", "NITRITE", "LEUKOCYTESUR", "UROBILINOGEN", "HYALINECASTS" in the last 2160 hours.   LIPIDS:  No results for input(s): "TSH", "HDL", "CHOL", "TRIG", "LDLCALC", "CHOLHDL", "NONHDLCHOL", "TOTALCHOLEST" in the last 2160 hours.  TSH:  No results for input(s): "TSH" in the last 2160 hours.  A1C:  No results for input(s): "HGBA1C" in the last 2160 hours.    Radiology:        Assessment:     Erik Rodriguez is a 69 y.o.male with:    Numbness and tingling of lower extremity  -     Magnesium; Future; Expected date: 01/11/2024  -     BASIC METABOLIC PANEL; Future; Expected date: 01/11/2024                Plan:     Problem List Items Addressed This Visit          Neuro    Numbness and tingling of lower extremity - Primary    Overview     Will check bmp and magnesium levels today             As above, continue current medications and maintain follow up with specialists.  Return to clinic in 6 months.      Frederick W Dantagnan [facility] [place]                  "

## 2024-01-12 ENCOUNTER — PATIENT MESSAGE (OUTPATIENT)
Dept: PRIMARY CARE CLINIC | Facility: CLINIC | Age: 69
End: 2024-01-12
Payer: MEDICARE

## 2024-04-22 ENCOUNTER — OFFICE VISIT (OUTPATIENT)
Dept: PRIMARY CARE CLINIC | Facility: CLINIC | Age: 69
End: 2024-04-22
Payer: MEDICARE

## 2024-04-22 VITALS
RESPIRATION RATE: 18 BRPM | TEMPERATURE: 98 F | SYSTOLIC BLOOD PRESSURE: 124 MMHG | HEIGHT: 70 IN | HEART RATE: 67 BPM | BODY MASS INDEX: 27.27 KG/M2 | OXYGEN SATURATION: 99 % | WEIGHT: 190.5 LBS | DIASTOLIC BLOOD PRESSURE: 84 MMHG

## 2024-04-22 DIAGNOSIS — J30.1 SEASONAL ALLERGIC RHINITIS DUE TO POLLEN: ICD-10-CM

## 2024-04-22 DIAGNOSIS — Z12.5 PROSTATE CANCER SCREENING: ICD-10-CM

## 2024-04-22 DIAGNOSIS — Z86.73 HISTORY OF TIA (TRANSIENT ISCHEMIC ATTACK): Primary | ICD-10-CM

## 2024-04-22 DIAGNOSIS — E78.2 MIXED HYPERLIPIDEMIA: ICD-10-CM

## 2024-04-22 DIAGNOSIS — Z01.818 PREOP EXAM FOR INTERNAL MEDICINE: ICD-10-CM

## 2024-04-22 DIAGNOSIS — M17.12 PRIMARY OSTEOARTHRITIS OF LEFT KNEE: ICD-10-CM

## 2024-04-22 DIAGNOSIS — H10.13 ALLERGIC CONJUNCTIVITIS OF BOTH EYES: ICD-10-CM

## 2024-04-22 PROCEDURE — 99213 OFFICE O/P EST LOW 20 MIN: CPT | Mod: PBBFAC | Performed by: INTERNAL MEDICINE

## 2024-04-22 PROCEDURE — 93005 ELECTROCARDIOGRAM TRACING: CPT | Mod: PBBFAC | Performed by: INTERNAL MEDICINE

## 2024-04-22 PROCEDURE — 93010 ELECTROCARDIOGRAM REPORT: CPT | Mod: S$PBB,,, | Performed by: INTERNAL MEDICINE

## 2024-04-22 PROCEDURE — 99214 OFFICE O/P EST MOD 30 MIN: CPT | Mod: S$PBB,,, | Performed by: INTERNAL MEDICINE

## 2024-04-22 PROCEDURE — 99999 PR PBB SHADOW E&M-EST. PATIENT-LVL III: CPT | Mod: PBBFAC,,, | Performed by: INTERNAL MEDICINE

## 2024-04-22 RX ORDER — ATORVASTATIN CALCIUM 20 MG/1
20 TABLET, FILM COATED ORAL NIGHTLY
Qty: 90 TABLET | Refills: 3 | Status: SHIPPED | OUTPATIENT
Start: 2024-04-22

## 2024-04-22 RX ORDER — CLOPIDOGREL BISULFATE 75 MG/1
75 TABLET ORAL DAILY
Qty: 90 TABLET | Refills: 3 | Status: SHIPPED | OUTPATIENT
Start: 2024-04-22

## 2024-04-22 RX ORDER — AZELASTINE HYDROCHLORIDE 0.5 MG/ML
1 SOLUTION/ DROPS OPHTHALMIC 2 TIMES DAILY
Qty: 18 ML | Refills: 3 | Status: SHIPPED | OUTPATIENT
Start: 2024-04-22 | End: 2025-04-22

## 2024-04-22 NOTE — PROGRESS NOTES
[facility] Note    Chief Complaint      Chief Complaint   Patient presents with    Pre-op Exam       History of Present Illness      Erik Rodriguez is a 69 y.o. male who presents today for   Chief Complaint   Patient presents with    Pre-op Exam   .  Patient comes to appointment here for preop int med visit for left tkr with Dr barreto . He has good functional status . Cona walk block or go up flight of steps with no sob or chest pain he has remote history of tia      HPI    No problem-specific Assessment & Plan notes found for this encounter.       Problem List Items Addressed This Visit          Neuro    History of TIA (transient ischemic attack) - Primary    Overview     ekg noted cont plavix .            Ophtho    Allergic conjunctivitis of both eyes       ENT    Seasonal allergic rhinitis due to pollen    Overview     Stable on current reigmen cont same             Cardiac/Vascular    Hyperlipidemia    Overview     lipitor 20mg   Labs due on may 23            Orthopedic    Primary osteoarthritis of left knee    Overview     For left tkr with   Dr barreto ortho            Other    Preop exam for internal medicine    Overview     ekg noted no st t changes  Cleared for procedure with low risk cardiac complications   plavix held for 7 days prior to procedure           Other Visit Diagnoses       Prostate cancer screening                 Past Medical History:  Past Medical History:   Diagnosis Date    Allergy     Fuchs' corneal dystrophy of both eyes     Hyperlipidemia     Stroke     TIA (transient ischemic attack) 07/2010       Past Surgical History:  Past Surgical History:   Procedure Laterality Date    INJECTION OF STEROID  01/2020    pat reported    KNEE ARTHROSCOPY Left 01/2013    KNEE CARTILAGE SURGERY      left knee 09/2008; right knee 01/2015    NASAL SEPTUM SURGERY  02/2016    SKIN BIOPSY  02/2021    from head-benign-pt reported    STEROID INJECTION HIP Left 02/22/2022    pt reported     TOTAL HIP ARTHROPLASTY Right 04/2013    pat reported / Done in [place]    TOTAL HIP ARTHROPLASTY Left 05/29/2023    TOTAL KNEE ARTHROPLASTY Right 09/11/2023       Family History:  family history includes Arthritis in his paternal grandfather; Cancer in his father, maternal grandmother, and paternal grandmother; Heart disease in his paternal grandfather; Heart failure in his mother; Kidney failure in his mother; Stroke in his maternal grandfather.     Social History:  Social History     Socioeconomic History    Marital status:    Tobacco Use    Smoking status: Never    Smokeless tobacco: Never   Substance and Sexual Activity    Alcohol use: Yes     Comment: occasionally    Drug use: Never    Sexual activity: Yes       Review of Systems:   Review of Systems   Constitutional:  Negative for fever and weight loss.   HENT:  Negative for congestion, hearing loss and sore throat.    Eyes:  Negative for blurred vision.   Respiratory:  Negative for cough and shortness of breath.    Cardiovascular:  Negative for chest pain, palpitations, claudication and leg swelling.   Gastrointestinal:  Negative for abdominal pain, constipation, diarrhea and heartburn.   Genitourinary:  Negative for dysuria.   Musculoskeletal:  Negative for back pain and myalgias.   Skin:  Negative for rash.   Neurological:  Negative for focal weakness and headaches.   Psychiatric/Behavioral:  Negative for depression and suicidal ideas. The patient is not nervous/anxious.    Dictation #1  MRN:[id number]  CSN:[id number]      Medications:  Outpatient Encounter Medications as of 4/22/2024   Medication Sig Note Dispense Refill    acetaminophen (TYLENOL) 500 MG tablet Take 500 mg by mouth as needed for Pain.       fluticasone propionate (FLONASE) 50 mcg/actuation nasal spray 1 spray (50 mcg total) by Each Nostril route 2 (two) times daily as needed for Rhinitis or Allergies.  48 g 3    ibuprofen (ADVIL,MOTRIN) 200 MG tablet Take 200 mg by mouth 2 (two)  "times daily as needed.  1/12/2018: Received from: External Pharmacy      montelukast (SINGULAIR) 10 mg tablet Take 1 tablet (10 mg total) by mouth every evening.  90 tablet 3    multivitamin capsule Take 1 capsule by mouth once daily.       psyllium (METAMUCIL) powder Take 1 packet by mouth as needed.       [DISCONTINUED] atorvastatin (LIPITOR) 20 MG tablet Take 1 tablet (20 mg total) by mouth every evening.  90 tablet 3    [DISCONTINUED] clopidogreL (PLAVIX) 75 mg tablet Take 1 tablet (75 mg total) by mouth once daily.  90 tablet 3    [DISCONTINUED] olopatadine (PATANOL) 0.1 % ophthalmic solution Place 1 drop into both eyes 2 (two) times daily.  15 mL 3    atorvastatin (LIPITOR) 20 MG tablet Take 1 tablet (20 mg total) by mouth every evening.  90 tablet 3    azelastine (OPTIVAR) 0.05 % ophthalmic solution Place 1 drop into both eyes 2 (two) times daily.  18 mL 3    clopidogreL (PLAVIX) 75 mg tablet Take 1 tablet (75 mg total) by mouth once daily.  90 tablet 3     No facility-administered encounter medications on file as of 4/22/2024.       Allergies:  Review of patient's allergies indicates:  No Known Allergies      Physical Exam      Vitals:    04/22/24 1512   BP: 124/84   Pulse: 67   Resp: 18   Temp: 98 °F (36.7 °C)        Vital Signs  Temp: 98 °F (36.7 °C)  Temp Source: Oral  Pulse: 67  Resp: 18  SpO2: 99 %  BP: 124/84  BP Location: Right arm  Patient Position: Sitting  Pain Score: 0-No pain  Height and Weight  Height: 5' 10" (177.8 cm)  Weight: 86.4 kg (190 lb 7.6 oz)  BSA (Calculated - sq m): 2.07 sq meters  BMI (Calculated): 27.3  Weight in (lb) to have BMI = 25: 173.9]     Body mass index is 27.33 kg/m².    Physical Exam  Constitutional:       Appearance: He is well-developed.   HENT:      Head: Normocephalic.   Eyes:      Pupils: Pupils are equal, round, and reactive to light.   Neck:      Thyroid: No thyromegaly.   Cardiovascular:      Rate and Rhythm: Normal rate and regular rhythm.      Heart sounds: No " "murmur heard.     No friction rub. No gallop.   Pulmonary:      Effort: Pulmonary effort is normal.      Breath sounds: Normal breath sounds.   Abdominal:      General: Bowel sounds are normal.      Palpations: Abdomen is soft.   Musculoskeletal:         General: Normal range of motion.      Cervical back: Normal range of motion.   Skin:     General: Skin is warm and dry.   Neurological:      Mental Status: He is alert and oriented to person, place, and time.      Sensory: No sensory deficit.   Psychiatric:         Behavior: Behavior normal.          Laboratory:  CBC:  No results for input(s): "WBC", "RBC", "HGB", "HCT", "PLT", "MCV", "MCH", "MCHC" in the last 2160 hours.  CMP:  No results for input(s): "GLU", "CALCIUM", "ALBUMIN", "PROT", "NA", "K", "CO2", "CL", "BUN", "ALKPHOS", "ALT", "AST", "BILITOT" in the last 2160 hours.    Invalid input(s): "CREATININ"  URINALYSIS:  No results for input(s): "COLORU", "CLARITYU", "SPECGRAV", "PHUR", "PROTEINUA", "GLUCOSEU", "BILIRUBINCON", "BLOODU", "WBCU", "RBCU", "BACTERIA", "MUCUS", "NITRITE", "LEUKOCYTESUR", "UROBILINOGEN", "HYALINECASTS" in the last 2160 hours.   LIPIDS:  No results for input(s): "TSH", "HDL", "CHOL", "TRIG", "LDLCALC", "CHOLHDL", "NONHDLCHOL", "TOTALCHOLEST" in the last 2160 hours.  TSH:  No results for input(s): "TSH" in the last 2160 hours.  A1C:  No results for input(s): "HGBA1C" in the last 2160 hours.    Radiology:        Assessment:     Erik Rodriguez is a 69 y.o.male with:    History of TIA (transient ischemic attack)  -     IN OFFICE EKG 12-LEAD (to Muse)  -     clopidogreL (PLAVIX) 75 mg tablet; Take 1 tablet (75 mg total) by mouth once daily.  Dispense: 90 tablet; Refill: 3  -     CBC Auto Differential; Future; Expected date: 05/02/2024    Primary osteoarthritis of left knee    Preop exam for internal medicine    Mixed hyperlipidemia  -     atorvastatin (LIPITOR) 20 MG tablet; Take 1 tablet (20 mg total) by mouth every evening.  Dispense: 90 " tablet; Refill: 3  -     Comprehensive Metabolic Panel; Future; Expected date: 05/02/2024  -     Lipid Panel; Future; Expected date: 05/02/2024    Allergic conjunctivitis of both eyes    Seasonal allergic rhinitis due to pollen  -     azelastine (OPTIVAR) 0.05 % ophthalmic solution; Place 1 drop into both eyes 2 (two) times daily.  Dispense: 18 mL; Refill: 3    Prostate cancer screening  -     PSA, Screening; Future; Expected date: 05/02/2024                Plan:     Problem List Items Addressed This Visit          Neuro    History of TIA (transient ischemic attack) - Primary    Overview     ekg noted cont plavix .            Ophtho    Allergic conjunctivitis of both eyes       ENT    Seasonal allergic rhinitis due to pollen    Overview     Stable on current reigmen cont same             Cardiac/Vascular    Hyperlipidemia    Overview     lipitor 20mg   Labs due on may 23            Orthopedic    Primary osteoarthritis of left knee    Overview     For left tkr with   Dr estevan cottrell            Other    Preop exam for internal medicine    Overview     ekg noted no st t changes  Cleared for procedure with low risk cardiac complications   plavix held for 7 days prior to procedure           Other Visit Diagnoses       Prostate cancer screening                As above, continue current medications and maintain follow up with specialists.  Return to clinic in 6 months.      Frederick W Dantagnan [facility]

## 2024-04-23 LAB
OHS QRS DURATION: 76 MS
OHS QTC CALCULATION: 421 MS

## 2024-05-03 ENCOUNTER — LAB VISIT (OUTPATIENT)
Dept: LAB | Facility: HOSPITAL | Age: 69
End: 2024-05-03
Attending: INTERNAL MEDICINE
Payer: MEDICARE

## 2024-05-03 DIAGNOSIS — Z12.5 PROSTATE CANCER SCREENING: ICD-10-CM

## 2024-05-03 DIAGNOSIS — E78.2 MIXED HYPERLIPIDEMIA: ICD-10-CM

## 2024-05-03 DIAGNOSIS — Z86.73 HISTORY OF TIA (TRANSIENT ISCHEMIC ATTACK): ICD-10-CM

## 2024-05-03 LAB
ALBUMIN SERPL BCP-MCNC: 4 G/DL (ref 3.5–5.2)
ALP SERPL-CCNC: 69 U/L (ref 55–135)
ALT SERPL W/O P-5'-P-CCNC: 27 U/L (ref 10–44)
ANION GAP SERPL CALC-SCNC: 9 MMOL/L (ref 8–16)
AST SERPL-CCNC: 28 U/L (ref 10–40)
BASOPHILS # BLD AUTO: 0.09 K/UL (ref 0–0.2)
BASOPHILS NFR BLD: 1.5 % (ref 0–1.9)
BILIRUB SERPL-MCNC: 1 MG/DL (ref 0.1–1)
BUN SERPL-MCNC: 12 MG/DL (ref 8–23)
CALCIUM SERPL-MCNC: 9.6 MG/DL (ref 8.7–10.5)
CHLORIDE SERPL-SCNC: 105 MMOL/L (ref 95–110)
CHOLEST SERPL-MCNC: 133 MG/DL (ref 120–199)
CHOLEST/HDLC SERPL: 2.7 {RATIO} (ref 2–5)
CO2 SERPL-SCNC: 26 MMOL/L (ref 23–29)
COMPLEXED PSA SERPL-MCNC: 2.3 NG/ML (ref 0–4)
CREAT SERPL-MCNC: 0.8 MG/DL (ref 0.5–1.4)
DIFFERENTIAL METHOD BLD: ABNORMAL
EOSINOPHIL # BLD AUTO: 0.3 K/UL (ref 0–0.5)
EOSINOPHIL NFR BLD: 4.8 % (ref 0–8)
ERYTHROCYTE [DISTWIDTH] IN BLOOD BY AUTOMATED COUNT: 13.2 % (ref 11.5–14.5)
EST. GFR  (NO RACE VARIABLE): >60 ML/MIN/1.73 M^2
GLUCOSE SERPL-MCNC: 90 MG/DL (ref 70–110)
HCT VFR BLD AUTO: 51.5 % (ref 40–54)
HDLC SERPL-MCNC: 50 MG/DL (ref 40–75)
HDLC SERPL: 37.6 % (ref 20–50)
HGB BLD-MCNC: 16.5 G/DL (ref 14–18)
IMM GRANULOCYTES # BLD AUTO: 0.01 K/UL (ref 0–0.04)
IMM GRANULOCYTES NFR BLD AUTO: 0.2 % (ref 0–0.5)
LDLC SERPL CALC-MCNC: 48.8 MG/DL (ref 63–159)
LYMPHOCYTES # BLD AUTO: 1 K/UL (ref 1–4.8)
LYMPHOCYTES NFR BLD: 16.6 % (ref 18–48)
MCH RBC QN AUTO: 30.3 PG (ref 27–31)
MCHC RBC AUTO-ENTMCNC: 32 G/DL (ref 32–36)
MCV RBC AUTO: 95 FL (ref 82–98)
MONOCYTES # BLD AUTO: 0.6 K/UL (ref 0.3–1)
MONOCYTES NFR BLD: 10.9 % (ref 4–15)
NEUTROPHILS # BLD AUTO: 3.9 K/UL (ref 1.8–7.7)
NEUTROPHILS NFR BLD: 66 % (ref 38–73)
NONHDLC SERPL-MCNC: 83 MG/DL
NRBC BLD-RTO: 0 /100 WBC
PLATELET # BLD AUTO: 259 K/UL (ref 150–450)
PMV BLD AUTO: 9.3 FL (ref 9.2–12.9)
POTASSIUM SERPL-SCNC: 4 MMOL/L (ref 3.5–5.1)
PROT SERPL-MCNC: 7.2 G/DL (ref 6–8.4)
RBC # BLD AUTO: 5.44 M/UL (ref 4.6–6.2)
SODIUM SERPL-SCNC: 140 MMOL/L (ref 136–145)
TRIGL SERPL-MCNC: 171 MG/DL (ref 30–150)
WBC # BLD AUTO: 5.85 K/UL (ref 3.9–12.7)

## 2024-05-03 PROCEDURE — 84153 ASSAY OF PSA TOTAL: CPT | Performed by: INTERNAL MEDICINE

## 2024-05-03 PROCEDURE — 85025 COMPLETE CBC W/AUTO DIFF WBC: CPT | Performed by: INTERNAL MEDICINE

## 2024-05-03 PROCEDURE — 80053 COMPREHEN METABOLIC PANEL: CPT | Performed by: INTERNAL MEDICINE

## 2024-05-03 PROCEDURE — 36415 COLL VENOUS BLD VENIPUNCTURE: CPT | Performed by: INTERNAL MEDICINE

## 2024-05-03 PROCEDURE — 80061 LIPID PANEL: CPT | Performed by: INTERNAL MEDICINE

## 2024-07-18 ENCOUNTER — OFFICE VISIT (OUTPATIENT)
Dept: PRIMARY CARE CLINIC | Facility: CLINIC | Age: 69
End: 2024-07-18
Payer: MEDICARE

## 2024-07-18 VITALS
RESPIRATION RATE: 18 BRPM | HEART RATE: 68 BPM | HEIGHT: 70 IN | SYSTOLIC BLOOD PRESSURE: 124 MMHG | OXYGEN SATURATION: 98 % | WEIGHT: 192.44 LBS | BODY MASS INDEX: 27.55 KG/M2 | TEMPERATURE: 97 F | DIASTOLIC BLOOD PRESSURE: 80 MMHG

## 2024-07-18 DIAGNOSIS — R20.2 NUMBNESS AND TINGLING OF LOWER EXTREMITY: Primary | ICD-10-CM

## 2024-07-18 DIAGNOSIS — Z86.73 HISTORY OF TIA (TRANSIENT ISCHEMIC ATTACK): ICD-10-CM

## 2024-07-18 DIAGNOSIS — F41.8 SITUATIONAL ANXIETY: ICD-10-CM

## 2024-07-18 DIAGNOSIS — E78.2 MIXED HYPERLIPIDEMIA: ICD-10-CM

## 2024-07-18 DIAGNOSIS — R20.0 NUMBNESS AND TINGLING OF LOWER EXTREMITY: Primary | ICD-10-CM

## 2024-07-18 PROCEDURE — 99214 OFFICE O/P EST MOD 30 MIN: CPT | Mod: PBBFAC | Performed by: INTERNAL MEDICINE

## 2024-07-18 PROCEDURE — 99214 OFFICE O/P EST MOD 30 MIN: CPT | Mod: S$PBB,,, | Performed by: INTERNAL MEDICINE

## 2024-07-18 PROCEDURE — 99999 PR PBB SHADOW E&M-EST. PATIENT-LVL IV: CPT | Mod: PBBFAC,,, | Performed by: INTERNAL MEDICINE

## 2024-07-18 RX ORDER — MONTELUKAST SODIUM 10 MG/1
10 TABLET ORAL NIGHTLY
Qty: 90 TABLET | Refills: 3 | Status: SHIPPED | OUTPATIENT
Start: 2024-07-18

## 2024-07-18 RX ORDER — PREGABALIN 75 MG/1
75 CAPSULE ORAL 2 TIMES DAILY
COMMUNITY
Start: 2024-06-24

## 2024-07-18 NOTE — PROGRESS NOTES
[facility] Note    Chief Complaint      Chief Complaint   Patient presents with    Follow-up     6m       History of Present Illness      Erik Rodriguez is a 69 y.o. male who presents today for   Chief Complaint   Patient presents with    Follow-up     6m   .  Patient comes to appointment here for 6m checkup for chronic issues . He is 9 weeks s/p left tkr with dr Kirkpatrick . He completed pt and is feeling well .     HPI    No problem-specific Assessment & Plan notes found for this encounter.       Problem List Items Addressed This Visit          Neuro    History of TIA (transient ischemic attack)    Overview     ekg noted cont plavix .         Numbness and tingling of lower extremity - Primary    Overview     Cont lyrica as prescribed             Psychiatric    Situational anxiety    Overview     Xanax he is rarely using  reviewed             Cardiac/Vascular    Hyperlipidemia    Overview     lipitor 20mg   Labs uptodaet              Past Medical History:  Past Medical History:   Diagnosis Date    Allergy     Fuchs' corneal dystrophy of both eyes     Hyperlipidemia     Stroke     TIA (transient ischemic attack) 07/2010       Past Surgical History:  Past Surgical History:   Procedure Laterality Date    INJECTION OF STEROID  01/2020    pat reported    KNEE ARTHROSCOPY Left 01/2013    KNEE CARTILAGE SURGERY      left knee 09/2008; right knee 01/2015    NASAL SEPTUM SURGERY  02/2016    SKIN BIOPSY  02/2021    from head-benign-pt reported    STEROID INJECTION HIP Left 02/22/2022    pt reported    TOTAL HIP ARTHROPLASTY Right 04/2013    pat reported / Done in [place]    TOTAL HIP ARTHROPLASTY Left 05/29/2023    TOTAL KNEE ARTHROPLASTY Right 09/11/2023       Family History:  family history includes Arthritis in his paternal grandfather; Cancer in his father, maternal grandmother, and paternal grandmother; Heart disease in his paternal grandfather; Heart failure in his mother; Kidney failure in his  mother; Stroke in his maternal grandfather.     Social History:  Social History     Socioeconomic History    Marital status:    Tobacco Use    Smoking status: Never    Smokeless tobacco: Never   Substance and Sexual Activity    Alcohol use: Yes     Comment: occasionally    Drug use: Never    Sexual activity: Yes       Review of Systems:   Review of Systems   Constitutional:  Negative for fever and weight loss.   HENT:  Negative for congestion, hearing loss and sore throat.    Eyes:  Negative for blurred vision.   Respiratory:  Negative for cough and shortness of breath.    Cardiovascular:  Negative for chest pain, palpitations, claudication and leg swelling.   Gastrointestinal:  Negative for abdominal pain, constipation, diarrhea and heartburn.   Genitourinary:  Negative for dysuria.   Musculoskeletal:  Negative for back pain and myalgias.   Skin:  Negative for rash.   Neurological:  Negative for focal weakness and headaches.   Psychiatric/Behavioral:  Negative for depression and suicidal ideas. The patient is not nervous/anxious.         Medications:  Outpatient Encounter Medications as of 7/18/2024   Medication Sig Note Dispense Refill    acetaminophen (TYLENOL) 500 MG tablet Take 500 mg by mouth as needed for Pain.       atorvastatin (LIPITOR) 20 MG tablet Take 1 tablet (20 mg total) by mouth every evening.  90 tablet 3    azelastine (OPTIVAR) 0.05 % ophthalmic solution Place 1 drop into both eyes 2 (two) times daily.  18 mL 3    clopidogreL (PLAVIX) 75 mg tablet Take 1 tablet (75 mg total) by mouth once daily.  90 tablet 3    fluticasone propionate (FLONASE) 50 mcg/actuation nasal spray 1 spray (50 mcg total) by Each Nostril route 2 (two) times daily as needed for Rhinitis or Allergies.  48 g 3    ibuprofen (ADVIL,MOTRIN) 200 MG tablet Take 200 mg by mouth 2 (two) times daily as needed.  1/12/2018: Received from: External Pharmacy      multivitamin capsule Take 1 capsule by mouth once daily.        "pregabalin (LYRICA) 75 MG capsule Take 75 mg by mouth 2 (two) times daily.       psyllium (METAMUCIL) powder Take 1 packet by mouth as needed.       [DISCONTINUED] montelukast (SINGULAIR) 10 mg tablet Take 1 tablet (10 mg total) by mouth every evening.  90 tablet 3    montelukast (SINGULAIR) 10 mg tablet Take 1 tablet (10 mg total) by mouth every evening.  90 tablet 3     No facility-administered encounter medications on file as of 7/18/2024.       Allergies:  Review of patient's allergies indicates:  No Known Allergies      Physical Exam      Vitals:    07/18/24 0839   BP: 124/80   Pulse: 68   Resp: 18   Temp: 97 °F (36.1 °C)        Vital Signs  Temp: 97 °F (36.1 °C)  Temp Source: Oral  Pulse: 68  Resp: 18  SpO2: 98 %  BP: 124/80  BP Location: Right arm  Patient Position: Sitting  Pain Score: 0-No pain  Height and Weight  Height: 5' 10" (177.8 cm)  Weight: 87.3 kg (192 lb 7.4 oz)  BSA (Calculated - sq m): 2.08 sq meters  BMI (Calculated): 27.6  Weight in (lb) to have BMI = 25: 173.9]     Body mass index is 27.62 kg/m².    Physical Exam  Constitutional:       Appearance: He is well-developed.   HENT:      Head: Normocephalic.   Eyes:      Pupils: Pupils are equal, round, and reactive to light.   Neck:      Thyroid: No thyromegaly.   Cardiovascular:      Rate and Rhythm: Normal rate and regular rhythm.      Heart sounds: No murmur heard.     No friction rub. No gallop.   Pulmonary:      Effort: Pulmonary effort is normal.      Breath sounds: Normal breath sounds.   Abdominal:      General: Bowel sounds are normal.      Palpations: Abdomen is soft.   Musculoskeletal:         General: Normal range of motion.      Cervical back: Normal range of motion.   Skin:     General: Skin is warm and dry.   Neurological:      Mental Status: He is alert and oriented to person, place, and time.      Sensory: No sensory deficit.   Psychiatric:         Behavior: Behavior normal.          Laboratory:  CBC:  Recent Labs   Lab Result " "Units 05/03/24  0739   WBC K/uL 5.85   RBC M/uL 5.44   Hemoglobin g/dL 16.5   Hematocrit % 51.5   Platelets K/uL 259   MCV fL 95   MCH pg 30.3   MCHC g/dL 32.0     CMP:  Recent Labs   Lab Result Units 05/03/24  0739   Glucose mg/dL 90   Calcium mg/dL 9.6   Albumin g/dL 4.0   Total Protein g/dL 7.2   Sodium mmol/L 140   Potassium mmol/L 4.0   CO2 mmol/L 26   Chloride mmol/L 105   BUN mg/dL 12   Alkaline Phosphatase U/L 69   ALT U/L 27   AST U/L 28   Total Bilirubin mg/dL 1.0     URINALYSIS:  No results for input(s): "COLORU", "CLARITYU", "SPECGRAV", "PHUR", "PROTEINUA", "GLUCOSEU", "BILIRUBINCON", "BLOODU", "WBCU", "RBCU", "BACTERIA", "MUCUS", "NITRITE", "LEUKOCYTESUR", "UROBILINOGEN", "HYALINECASTS" in the last 2160 hours.   LIPIDS:  Recent Labs   Lab Result Units 05/03/24  0739   HDL mg/dL 50   Cholesterol mg/dL 133   Triglycerides mg/dL 171*   LDL Cholesterol mg/dL 48.8*   HDL/Cholesterol Ratio % 37.6   Non-HDL Cholesterol mg/dL 83   Total Cholesterol/HDL Ratio  2.7     TSH:  No results for input(s): "TSH" in the last 2160 hours.  A1C:  No results for input(s): "HGBA1C" in the last 2160 hours.    Radiology:        Assessment:     Erik Rodriguez is a 69 y.o.male with:    Numbness and tingling of lower extremity    Situational anxiety    History of TIA (transient ischemic attack)    Mixed hyperlipidemia    Other orders  -     montelukast (SINGULAIR) 10 mg tablet; Take 1 tablet (10 mg total) by mouth every evening.  Dispense: 90 tablet; Refill: 3                Plan:     Problem List Items Addressed This Visit          Neuro    History of TIA (transient ischemic attack)    Overview     ekg noted cont plavix .         Numbness and tingling of lower extremity - Primary    Overview     Cont lyrica as prescribed             Psychiatric    Situational anxiety    Overview     Xanax he is rarely using  reviewed             Cardiac/Vascular    Hyperlipidemia    Overview     lipitor 20mg   Labs uptodaet             As above, " continue current medications and maintain follow up with specialists.  Return to clinic in 6 months.      Frederick W Dantagnan [facility]

## 2024-12-28 ENCOUNTER — OFFICE VISIT (OUTPATIENT)
Dept: URGENT CARE | Facility: CLINIC | Age: 69
End: 2024-12-28
Payer: MEDICARE

## 2024-12-28 VITALS
BODY MASS INDEX: 27.46 KG/M2 | HEIGHT: 70 IN | WEIGHT: 191.81 LBS | HEART RATE: 87 BPM | DIASTOLIC BLOOD PRESSURE: 80 MMHG | RESPIRATION RATE: 17 BRPM | SYSTOLIC BLOOD PRESSURE: 125 MMHG | OXYGEN SATURATION: 99 % | TEMPERATURE: 99 F

## 2024-12-28 DIAGNOSIS — R60.9 MUCOSAL EDEMA: ICD-10-CM

## 2024-12-28 DIAGNOSIS — J34.89 SINUS PRESSURE: ICD-10-CM

## 2024-12-28 DIAGNOSIS — J01.90 ACUTE RHINOSINUSITIS: Primary | ICD-10-CM

## 2024-12-28 LAB
CTP QC/QA: YES
SARS-COV-2 AG RESP QL IA.RAPID: NEGATIVE

## 2024-12-28 PROCEDURE — 87811 SARS-COV-2 COVID19 W/OPTIC: CPT | Mod: QW,S$GLB,,

## 2024-12-28 PROCEDURE — 99213 OFFICE O/P EST LOW 20 MIN: CPT | Mod: S$GLB,,,

## 2024-12-28 RX ORDER — LORATADINE 10 MG/1
10 TABLET ORAL DAILY
Qty: 30 TABLET | Refills: 0 | Status: SHIPPED | OUTPATIENT
Start: 2024-12-28

## 2024-12-28 RX ORDER — AZELASTINE 1 MG/ML
1 SPRAY, METERED NASAL 2 TIMES DAILY
Qty: 30 ML | Refills: 0 | Status: SHIPPED | OUTPATIENT
Start: 2024-12-28

## 2024-12-28 RX ORDER — PREDNISONE 20 MG/1
20 TABLET ORAL DAILY
Qty: 5 TABLET | Refills: 0 | Status: SHIPPED | OUTPATIENT
Start: 2024-12-28 | End: 2025-01-02

## 2024-12-28 NOTE — PROGRESS NOTES
"Subjective:      Patient ID: Erik Rodriguez is a 69 y.o. male.    Vitals:  height is 5' 10" (1.778 m) and weight is 87 kg (191 lb 12.8 oz). His oral temperature is 98.9 °F (37.2 °C). His blood pressure is 125/80 and his pulse is 87. His respiration is 17 and oxygen saturation is 99%.     Chief Complaint: Nasal Congestion (Entered by patient)    This is a 69 y.o male who presents today with chief complaint of head congestion, and bilateral ear feels full, and sinus pressure x3 days, denies body aches, chills, fever, SOB, and chest pain.  Home tx: Saline flush, flonase, otc allergy medicine and Tylenol and reports no improvement.     Sinus Problem  This is a new problem. The current episode started in the past 7 days. There has been no fever. Associated symptoms include congestion, coughing, ear pain (pressure), headaches and sinus pressure. Pertinent negatives include no chills, diaphoresis, hoarse voice, neck pain, shortness of breath, sneezing, sore throat or swollen glands.       Constitution: Negative for chills, sweating and fever.   HENT:  Positive for ear pain (pressure), congestion, postnasal drip and sinus pressure. Negative for ear discharge, sinus pain, sore throat, trouble swallowing and voice change.         +rhinorrhea   Neck: Negative for neck pain and neck stiffness.   Cardiovascular:  Negative for chest pain and palpitations.   Eyes:  Negative for eye pain and eye redness.   Respiratory:  Positive for cough. Negative for shortness of breath.    Gastrointestinal:  Negative for nausea, vomiting and diarrhea.   Musculoskeletal:  Negative for muscle cramps and muscle ache.   Skin:  Negative for pale and rash.   Allergic/Immunologic: Negative for itching and sneezing.   Neurological:  Positive for headaches. Negative for dizziness, light-headedness, passing out, disorientation and altered mental status.   Psychiatric/Behavioral:  Negative for altered mental status, disorientation and confusion.     "   Objective:     Physical Exam   Constitutional: He is oriented to person, place, and time.  Non-toxic appearance. He does not appear ill. No distress.   HENT:   Head: Normocephalic and atraumatic.   Ears:   Right Ear: Tympanic membrane, external ear and ear canal normal.   Left Ear: Tympanic membrane, external ear and ear canal normal.   Nose: Rhinorrhea and congestion present.   Mouth/Throat: Mucous membranes are moist. No oropharyngeal exudate or posterior oropharyngeal erythema. Oropharynx is clear.   Eyes: Conjunctivae are normal. Extraocular movement intact   Neck: Neck supple.   Cardiovascular: Normal rate, regular rhythm, normal heart sounds and normal pulses.   Pulmonary/Chest: Effort normal and breath sounds normal.   Abdominal: Normal appearance.   Musculoskeletal: Normal range of motion.         General: Normal range of motion.   Neurological: He is alert, oriented to person, place, and time and at baseline.   Skin: Skin is warm and dry.   Psychiatric: His behavior is normal. Mood normal.   Nursing note and vitals reviewed.    Assessment:     Results for orders placed or performed in visit on 12/28/24   SARS Coronavirus 2 Antigen, POCT Manual Read    Collection Time: 12/28/24 11:30 AM   Result Value Ref Range    SARS Coronavirus 2 Antigen Negative Negative     Acceptable Yes        1. Acute rhinosinusitis    2. Mucosal edema    3. Sinus pressure        Plan:     Acute rhinosinusitis  -     azelastine (ASTELIN) 137 mcg (0.1 %) nasal spray; 1 spray (137 mcg total) by Nasal route 2 (two) times daily.  Dispense: 30 mL; Refill: 0  -     loratadine (CLARITIN) 10 mg tablet; Take 1 tablet (10 mg total) by mouth once daily.  Dispense: 30 tablet; Refill: 0    Mucosal edema  -     predniSONE (DELTASONE) 20 MG tablet; Take 1 tablet (20 mg total) by mouth once daily. for 5 days  Dispense: 5 tablet; Refill: 0    Sinus pressure  -     SARS Coronavirus 2 Antigen, POCT Manual Read            Patient  Instructions   Acute Rhinosinusitis  Treatment  Loratadine (Claritin) 10 mg tablet daily in the morning.  Azelastine (Astelin) nasal spray to help dry up mucus.   Intranasal steroid spray (Flonase) to reduce swelling and inflammation of the nose and sinuses.  If symptoms are not improving or worse in 2-3 days, start taking prednisone 20 mg tablet once daily in the morning for 5 days.  Discussed adverse side effects of recurrent/long term oral steroid use: elevated blood pressure elevated blood sugar, pancreatitis,glaucoma/cataracts, weight gain in face/abdomen/neck, round face (moon face), fluid retention in legs/lungs, mood swings, upset stomach, increased risk of infections, osteoporosis and increased risk of fractures, fatigue, loss of appetite, nausea and muscle weakness, thin skin, bruising and slower wound healing.   Guaifenesin (Mucinex) to help thin the mucus  Tylenol (Acetaminophen) 650 mg to 1 g every 6 hours and/or Motrin (Ibuprofen) 600 to 800 mg every 6 hours as needed for pain and/or fever    Care at home  Nasal saline spray or nasal sinus rinses, such as Neilmed or Netti Pot, which you can  at your local drugstore. Please do this twice a day while having symptoms and then as needed.  Steam tenting  Please drink plenty of fluids.  Please get plenty of rest.  If you smoke, please stop smoking.  To help ease a sore throat, you can:  Use a sore throat spray.  Suck on hard candy or throat lozenges.  Gargle with warm saltwater a few times each day. Mix of 1/4 teaspoon (1.25 grams) salt in 8 ounces (240 mL) of warm water.  Use a cool mist humidifier to help you breathe easier.    Please remember that you have received care at an urgent care today. Urgent cares are not emergency rooms and are not equipped to handle life threatening emergencies and cannot rule in or out certain medical conditions and you may be released before all of your medical problems are known or treated.     Please arrange follow  up with your primary care physician or speciality clinic within 2-5 days if your signs and symptoms have not resolved or worsen.     Patient can call our Referral Hotline at [phone] to make an appointment.    Please return here or go to the Emergency Department for any concerns or worsening of condition.  Signs of infection. These include a fever of 100.4°F (38°C) or higher, chills, cough, more sputum or change in color of sputum.  You are having so much trouble breathing that you can only say one or two words at a time.  You need to sit upright at all times to be able to breathe and or cannot lie down.  You have trouble breathing when talking or sitting still.  You have a fever of 100.4°F (38°C) or higher or chills.  You have chest pain when you cough, have trouble breathing but can still talk in full sentences, or cough up blood.

## 2024-12-28 NOTE — PATIENT INSTRUCTIONS
Acute Rhinosinusitis  Treatment  Loratadine (Claritin) 10 mg tablet daily in the morning.  Azelastine (Astelin) nasal spray to help dry up mucus.   Intranasal steroid spray (Flonase) to reduce swelling and inflammation of the nose and sinuses.  If symptoms are not improving or worse in 2-3 days, start taking prednisone 20 mg tablet once daily in the morning for 5 days.  Discussed adverse side effects of recurrent/long term oral steroid use: elevated blood pressure elevated blood sugar, pancreatitis,glaucoma/cataracts, weight gain in face/abdomen/neck, round face (moon face), fluid retention in legs/lungs, mood swings, upset stomach, increased risk of infections, osteoporosis and increased risk of fractures, fatigue, loss of appetite, nausea and muscle weakness, thin skin, bruising and slower wound healing.   Guaifenesin (Mucinex) to help thin the mucus  Tylenol (Acetaminophen) 650 mg to 1 g every 6 hours and/or Motrin (Ibuprofen) 600 to 800 mg every 6 hours as needed for pain and/or fever    Care at home  Nasal saline spray or nasal sinus rinses, such as Neilmed or Netti Pot, which you can  at your local drugstore. Please do this twice a day while having symptoms and then as needed.  Steam tenting  Please drink plenty of fluids.  Please get plenty of rest.  If you smoke, please stop smoking.  To help ease a sore throat, you can:  Use a sore throat spray.  Suck on hard candy or throat lozenges.  Gargle with warm saltwater a few times each day. Mix of 1/4 teaspoon (1.25 grams) salt in 8 ounces (240 mL) of warm water.  Use a cool mist humidifier to help you breathe easier.    Please remember that you have received care at an urgent care today. Urgent cares are not emergency rooms and are not equipped to handle life threatening emergencies and cannot rule in or out certain medical conditions and you may be released before all of your medical problems are known or treated.     Please arrange follow up with your  primary care physician or speciality clinic within 2-5 days if your signs and symptoms have not resolved or worsen.     Patient can call our Referral Hotline at [phone] to make an appointment.    Please return here or go to the Emergency Department for any concerns or worsening of condition.  Signs of infection. These include a fever of 100.4°F (38°C) or higher, chills, cough, more sputum or change in color of sputum.  You are having so much trouble breathing that you can only say one or two words at a time.  You need to sit upright at all times to be able to breathe and or cannot lie down.  You have trouble breathing when talking or sitting still.  You have a fever of 100.4°F (38°C) or higher or chills.  You have chest pain when you cough, have trouble breathing but can still talk in full sentences, or cough up blood.

## 2025-02-24 DIAGNOSIS — Z00.00 ENCOUNTER FOR MEDICARE ANNUAL WELLNESS EXAM: ICD-10-CM

## 2025-03-17 ENCOUNTER — OFFICE VISIT (OUTPATIENT)
Dept: PRIMARY CARE CLINIC | Facility: CLINIC | Age: 70
End: 2025-03-17
Payer: MEDICARE

## 2025-03-17 VITALS
HEART RATE: 63 BPM | HEIGHT: 70 IN | WEIGHT: 197.75 LBS | OXYGEN SATURATION: 98 % | BODY MASS INDEX: 28.31 KG/M2 | RESPIRATION RATE: 18 BRPM | SYSTOLIC BLOOD PRESSURE: 118 MMHG | DIASTOLIC BLOOD PRESSURE: 80 MMHG

## 2025-03-17 DIAGNOSIS — E78.2 MIXED HYPERLIPIDEMIA: Primary | ICD-10-CM

## 2025-03-17 DIAGNOSIS — R20.2 NUMBNESS AND TINGLING OF LOWER EXTREMITY: ICD-10-CM

## 2025-03-17 DIAGNOSIS — Z86.73 HISTORY OF TIA (TRANSIENT ISCHEMIC ATTACK): ICD-10-CM

## 2025-03-17 DIAGNOSIS — R20.0 NUMBNESS AND TINGLING OF LOWER EXTREMITY: ICD-10-CM

## 2025-03-17 DIAGNOSIS — R09.81 SINUS CONGESTION: ICD-10-CM

## 2025-03-17 DIAGNOSIS — F41.8 SITUATIONAL ANXIETY: ICD-10-CM

## 2025-03-17 DIAGNOSIS — J01.90 ACUTE RHINOSINUSITIS: ICD-10-CM

## 2025-03-17 DIAGNOSIS — Z12.5 PROSTATE CANCER SCREENING: ICD-10-CM

## 2025-03-17 PROCEDURE — 99999 PR PBB SHADOW E&M-EST. PATIENT-LVL III: CPT | Mod: PBBFAC,,, | Performed by: INTERNAL MEDICINE

## 2025-03-17 PROCEDURE — 99213 OFFICE O/P EST LOW 20 MIN: CPT | Mod: PBBFAC | Performed by: INTERNAL MEDICINE

## 2025-03-17 PROCEDURE — 99214 OFFICE O/P EST MOD 30 MIN: CPT | Mod: S$PBB,,, | Performed by: INTERNAL MEDICINE

## 2025-03-17 RX ORDER — LORATADINE 10 MG/1
10 TABLET ORAL DAILY
Qty: 30 TABLET | Refills: 1 | Status: SHIPPED | OUTPATIENT
Start: 2025-03-17

## 2025-03-17 RX ORDER — FLUTICASONE PROPIONATE 50 MCG
1 SPRAY, SUSPENSION (ML) NASAL 2 TIMES DAILY PRN
Qty: 48 G | Refills: 3 | Status: SHIPPED | OUTPATIENT
Start: 2025-03-17

## 2025-03-17 RX ORDER — CLOPIDOGREL BISULFATE 75 MG/1
75 TABLET ORAL DAILY
Qty: 90 TABLET | Refills: 3 | Status: SHIPPED | OUTPATIENT
Start: 2025-03-17

## 2025-03-17 RX ORDER — ATORVASTATIN CALCIUM 20 MG/1
20 TABLET, FILM COATED ORAL NIGHTLY
Qty: 90 TABLET | Refills: 3 | Status: SHIPPED | OUTPATIENT
Start: 2025-03-17

## 2025-03-17 RX ORDER — LEVOCETIRIZINE DIHYDROCHLORIDE 5 MG/1
2.5 TABLET, FILM COATED ORAL NIGHTLY
COMMUNITY

## 2025-03-17 RX ORDER — EPINEPHRINE 0.3 MG/.3ML
INJECTION SUBCUTANEOUS ONCE
COMMUNITY
Start: 2025-03-10

## 2025-03-17 NOTE — PROGRESS NOTES
[facility] Note    Chief Complaint      Chief Complaint   Patient presents with    Annual Exam       History of Present Illness      Erik Rodriguez is a 70 y.o. male who presents today for   Chief Complaint   Patient presents with    Annual Exam   .  Patient comes to appointment here for yearly checkup . He is feeling well . Is complaint with all meds . He is currently c/o tingling in his left upper thigh after hip replacement .     HPI    No problem-specific Assessment & Plan notes found for this encounter.       Problem List Items Addressed This Visit       Hyperlipidemia - Primary    Overview   lipitor 20mg   Labs uptodaet          Situational anxiety    Overview   Xanax prn he is rarely using  reviewed          History of TIA (transient ischemic attack)    Overview   ekg noted cont plavix .         Numbness and tingling of lower extremity    Overview   Cont lyrica as prescribed ortho managing          Prostate cancer screening    Acute rhinosinusitis    Overview   Stable cont current              Past Medical History:  Past Medical History:   Diagnosis Date    Allergy     Fuchs' corneal dystrophy of both eyes     Hyperlipidemia     Stroke     TIA (transient ischemic attack) 07/2010       Past Surgical History:  Past Surgical History:   Procedure Laterality Date    INJECTION OF STEROID  01/2020    pat reported    KNEE ARTHROSCOPY Left 01/2013    KNEE CARTILAGE SURGERY      left knee 09/2008; right knee 01/2015    NASAL SEPTUM SURGERY  02/2016    SKIN BIOPSY  02/2021    from head-benign-pt reported    STEROID INJECTION HIP Left 02/22/2022    pt reported    TOTAL HIP ARTHROPLASTY Right 04/2013    pat reported / Done in [place]    TOTAL HIP ARTHROPLASTY Left 05/29/2023    TOTAL KNEE ARTHROPLASTY Right 09/11/2023       Family History:  family history includes Arthritis in his paternal grandfather; Cancer in his father, maternal grandmother, and paternal grandmother; Heart disease in his  "paternal grandfather; Heart failure in his mother; Kidney failure in his mother; Stroke in his maternal grandfather.     Social History:  Social History[1]    Review of Systems:   Review of Systems   Constitutional:  Negative for fever and weight loss.   HENT:  Negative for congestion, hearing loss and sore throat.    Eyes:  Negative for blurred vision.   Respiratory:  Negative for cough and shortness of breath.    Cardiovascular:  Negative for chest pain, palpitations, claudication and leg swelling.   Gastrointestinal:  Negative for abdominal pain, constipation, diarrhea and heartburn.   Genitourinary:  Negative for dysuria.   Musculoskeletal:  Negative for back pain and myalgias.   Skin:  Negative for rash.   Neurological:  Positive for tingling. Negative for focal weakness and headaches.   Psychiatric/Behavioral:  Negative for depression, memory loss and suicidal ideas. The patient is not nervous/anxious.         Medications:  Encounter Medications[2]    Allergies:  Review of patient's allergies indicates:  No Known Allergies      Physical Exam      Vitals:    03/17/25 0847   BP: 118/80   Pulse: 63   Resp: 18        Vital Signs  Temp Source: Oral  Pulse: 63  Resp: 18  SpO2: 98 %  BP: 118/80  BP Location: Left arm  Patient Position: Sitting  Pain Score: 0-No pain  Height and Weight  Height: 5' 10" (177.8 cm)  Weight: 89.7 kg (197 lb 12 oz)  BSA (Calculated - sq m): 2.1 sq meters  BMI (Calculated): 28.4  Weight in (lb) to have BMI = 25: 173.9]     Body mass index is 28.37 kg/m².    Physical Exam  Constitutional:       Appearance: He is well-developed.   HENT:      Head: Normocephalic.   Eyes:      Pupils: Pupils are equal, round, and reactive to light.   Neck:      Thyroid: No thyromegaly.   Cardiovascular:      Rate and Rhythm: Normal rate and regular rhythm.      Heart sounds: No murmur heard.     No friction rub. No gallop.   Pulmonary:      Effort: Pulmonary effort is normal.      Breath sounds: Normal breath " "sounds.   Abdominal:      General: Bowel sounds are normal.      Palpations: Abdomen is soft.   Musculoskeletal:         General: Normal range of motion.      Cervical back: Normal range of motion.   Skin:     General: Skin is warm and dry.   Neurological:      Mental Status: He is alert and oriented to person, place, and time.      Sensory: No sensory deficit.   Psychiatric:         Behavior: Behavior normal.          Laboratory:  CBC:  No results for input(s): "WBC", "RBC", "HGB", "HCT", "PLT", "MCV", "MCH", "MCHC" in the last 2160 hours.  CMP:  No results for input(s): "GLU", "CALCIUM", "ALBUMIN", "PROT", "NA", "K", "CO2", "CL", "BUN", "ALKPHOS", "ALT", "AST", "BILITOT" in the last 2160 hours.    Invalid input(s): "CREATININ"  URINALYSIS:  No results for input(s): "COLORU", "CLARITYU", "SPECGRAV", "PHUR", "PROTEINUA", "GLUCOSEU", "BILIRUBINCON", "BLOODU", "WBCU", "RBCU", "BACTERIA", "MUCUS", "NITRITE", "LEUKOCYTESUR", "UROBILINOGEN", "HYALINECASTS" in the last 2160 hours.   LIPIDS:  No results for input(s): "TSH", "HDL", "CHOL", "TRIG", "LDLCALC", "CHOLHDL", "NONHDLCHOL", "TOTALCHOLEST" in the last 2160 hours.  TSH:  No results for input(s): "TSH" in the last 2160 hours.  A1C:  No results for input(s): "HGBA1C" in the last 2160 hours.    Radiology:        Assessment:     Erik Rodriguez is a 70 y.o.male with:    Mixed hyperlipidemia  -     Comprehensive Metabolic Panel; Future; Expected date: 05/04/2025  -     Lipid Panel; Future; Expected date: 05/04/2025    Prostate cancer screening  -     PSA, Screening; Future; Expected date: 05/04/2025    History of TIA (transient ischemic attack)  -     CBC Auto Differential; Future; Expected date: 05/04/2025    Numbness and tingling of lower extremity    Situational anxiety    Acute rhinosinusitis  -     loratadine (CLARITIN) 10 mg tablet; Take 1 tablet (10 mg total) by mouth once daily.  Dispense: 30 tablet; Refill: 1                Plan:     Problem List Items Addressed " This Visit       Hyperlipidemia - Primary    Overview   lipitor 20mg   Labs uptodaet          Situational anxiety    Overview   Xanax prn he is rarely using  reviewed          History of TIA (transient ischemic attack)    Overview   ekg noted cont plavix .         Numbness and tingling of lower extremity    Overview   Cont lyrica as prescribed ortho managing          Prostate cancer screening    Acute rhinosinusitis    Overview   Stable cont current             As above, continue current medications and maintain follow up with specialists.  Return to clinic in 6 months.      Frederick W Dantagnan [facility]                       [1]   Social History  Socioeconomic History    Marital status:    Tobacco Use    Smoking status: Never    Smokeless tobacco: Never   Substance and Sexual Activity    Alcohol use: Yes     Comment: occasionally    Drug use: Never    Sexual activity: Yes   [2]   Outpatient Encounter Medications as of 3/17/2025   Medication Sig Note Dispense Refill    acetaminophen (TYLENOL) 500 MG tablet Take 500 mg by mouth as needed for Pain.       atorvastatin (LIPITOR) 20 MG tablet Take 1 tablet (20 mg total) by mouth every evening.  90 tablet 3    azelastine (ASTELIN) 137 mcg (0.1 %) nasal spray 1 spray (137 mcg total) by Nasal route 2 (two) times daily.  30 mL 0    azelastine (OPTIVAR) 0.05 % ophthalmic solution Place 1 drop into both eyes 2 (two) times daily.  18 mL 3    clopidogreL (PLAVIX) 75 mg tablet Take 1 tablet (75 mg total) by mouth once daily.  90 tablet 3    EPINEPHrine (EPIPEN) 0.3 mg/0.3 mL AtIn Inject into the muscle once.       fluticasone propionate (FLONASE) 50 mcg/actuation nasal spray 1 spray (50 mcg total) by Each Nostril route 2 (two) times daily as needed for Rhinitis or Allergies.  48 g 3    ibuprofen (ADVIL,MOTRIN) 200 MG tablet Take 200 mg by mouth 2 (two) times daily as needed.  1/12/2018: Received from: External Pharmacy       levocetirizine (XYZAL) 5 MG tablet Take 2.5 mg by mouth every evening.       montelukast (SINGULAIR) 10 mg tablet Take 1 tablet (10 mg total) by mouth every evening.  90 tablet 3    multivitamin capsule Take 1 capsule by mouth once daily.       pregabalin (LYRICA) 75 MG capsule Take 75 mg by mouth 2 (two) times daily.       psyllium (METAMUCIL) powder Take 1 packet by mouth as needed.       [DISCONTINUED] loratadine (CLARITIN) 10 mg tablet Take 1 tablet (10 mg total) by mouth once daily.  30 tablet 0    loratadine (CLARITIN) 10 mg tablet Take 1 tablet (10 mg total) by mouth once daily.  30 tablet 1     No facility-administered encounter medications on file as of 3/17/2025.

## 2025-05-08 ENCOUNTER — LAB VISIT (OUTPATIENT)
Dept: LAB | Facility: HOSPITAL | Age: 70
End: 2025-05-08
Attending: INTERNAL MEDICINE
Payer: MEDICARE

## 2025-05-08 DIAGNOSIS — E78.2 MIXED HYPERLIPIDEMIA: ICD-10-CM

## 2025-05-08 DIAGNOSIS — Z86.73 HISTORY OF TIA (TRANSIENT ISCHEMIC ATTACK): ICD-10-CM

## 2025-05-08 DIAGNOSIS — Z12.5 PROSTATE CANCER SCREENING: ICD-10-CM

## 2025-05-08 LAB
ABSOLUTE EOSINOPHIL (OHS): 0.22 K/UL
ABSOLUTE MONOCYTE (OHS): 0.6 K/UL (ref 0.3–1)
ABSOLUTE NEUTROPHIL COUNT (OHS): 3.06 K/UL (ref 1.8–7.7)
ALBUMIN SERPL BCP-MCNC: 4 G/DL (ref 3.5–5.2)
ALP SERPL-CCNC: 69 UNIT/L (ref 40–150)
ALT SERPL W/O P-5'-P-CCNC: 25 UNIT/L (ref 10–44)
ANION GAP (OHS): 7 MMOL/L (ref 8–16)
AST SERPL-CCNC: 24 UNIT/L (ref 11–45)
BASOPHILS # BLD AUTO: 0.06 K/UL
BASOPHILS NFR BLD AUTO: 1.2 %
BILIRUB SERPL-MCNC: 0.7 MG/DL (ref 0.1–1)
BUN SERPL-MCNC: 16 MG/DL (ref 8–23)
CALCIUM SERPL-MCNC: 9 MG/DL (ref 8.7–10.5)
CHLORIDE SERPL-SCNC: 105 MMOL/L (ref 95–110)
CHOLEST SERPL-MCNC: 125 MG/DL (ref 120–199)
CHOLEST/HDLC SERPL: 2.7 {RATIO} (ref 2–5)
CO2 SERPL-SCNC: 28 MMOL/L (ref 23–29)
CREAT SERPL-MCNC: 0.9 MG/DL (ref 0.5–1.4)
ERYTHROCYTE [DISTWIDTH] IN BLOOD BY AUTOMATED COUNT: 13.2 % (ref 11.5–14.5)
GFR SERPLBLD CREATININE-BSD FMLA CKD-EPI: >60 ML/MIN/1.73/M2
GLUCOSE SERPL-MCNC: 95 MG/DL (ref 70–110)
HCT VFR BLD AUTO: 51.3 % (ref 40–54)
HDLC SERPL-MCNC: 47 MG/DL (ref 40–75)
HDLC SERPL: 37.6 % (ref 20–50)
HGB BLD-MCNC: 16.2 GM/DL (ref 14–18)
IMM GRANULOCYTES # BLD AUTO: 0.01 K/UL (ref 0–0.04)
IMM GRANULOCYTES NFR BLD AUTO: 0.2 % (ref 0–0.5)
LDLC SERPL CALC-MCNC: 54.2 MG/DL (ref 63–159)
LYMPHOCYTES # BLD AUTO: 1.21 K/UL (ref 1–4.8)
MCH RBC QN AUTO: 29.4 PG (ref 27–31)
MCHC RBC AUTO-ENTMCNC: 31.6 G/DL (ref 32–36)
MCV RBC AUTO: 93 FL (ref 82–98)
NONHDLC SERPL-MCNC: 78 MG/DL
NUCLEATED RBC (/100WBC) (OHS): 0 /100 WBC
PLATELET # BLD AUTO: 237 K/UL (ref 150–450)
PMV BLD AUTO: 9.1 FL (ref 9.2–12.9)
POTASSIUM SERPL-SCNC: 4.4 MMOL/L (ref 3.5–5.1)
PROT SERPL-MCNC: 7.1 GM/DL (ref 6–8.4)
PSA SERPL-MCNC: 4.31 NG/ML
RBC # BLD AUTO: 5.51 M/UL (ref 4.6–6.2)
RELATIVE EOSINOPHIL (OHS): 4.3 %
RELATIVE LYMPHOCYTE (OHS): 23.4 % (ref 18–48)
RELATIVE MONOCYTE (OHS): 11.6 % (ref 4–15)
RELATIVE NEUTROPHIL (OHS): 59.3 % (ref 38–73)
SODIUM SERPL-SCNC: 140 MMOL/L (ref 136–145)
TRIGL SERPL-MCNC: 119 MG/DL (ref 30–150)
WBC # BLD AUTO: 5.16 K/UL (ref 3.9–12.7)

## 2025-05-08 PROCEDURE — 84153 ASSAY OF PSA TOTAL: CPT

## 2025-05-08 PROCEDURE — 80053 COMPREHEN METABOLIC PANEL: CPT

## 2025-05-08 PROCEDURE — 80061 LIPID PANEL: CPT

## 2025-05-08 PROCEDURE — 36415 COLL VENOUS BLD VENIPUNCTURE: CPT

## 2025-05-08 PROCEDURE — 85025 COMPLETE CBC W/AUTO DIFF WBC: CPT

## 2025-05-13 ENCOUNTER — PATIENT MESSAGE (OUTPATIENT)
Dept: PRIMARY CARE CLINIC | Facility: CLINIC | Age: 70
End: 2025-05-13
Payer: MEDICARE

## 2025-05-13 DIAGNOSIS — R97.20 ELEVATED PSA: Primary | ICD-10-CM

## 2025-05-13 NOTE — TELEPHONE ENCOUNTER
LOV with Freddie Rodríguez MD , 3/17/2025  Concerned with lab results from 5/8/25. PSA increased from 2.3 to 4.31

## 2025-05-14 NOTE — TELEPHONE ENCOUNTER
Unsure if you would like to order a repeat screening psa or diagnostic. Therefore I did not pend the orders for your review

## 2025-06-03 ENCOUNTER — OFFICE VISIT (OUTPATIENT)
Dept: PRIMARY CARE CLINIC | Facility: CLINIC | Age: 70
End: 2025-06-03
Payer: MEDICARE

## 2025-06-03 VITALS
HEART RATE: 70 BPM | WEIGHT: 189.63 LBS | SYSTOLIC BLOOD PRESSURE: 120 MMHG | RESPIRATION RATE: 18 BRPM | BODY MASS INDEX: 27.15 KG/M2 | HEIGHT: 70 IN | OXYGEN SATURATION: 98 % | DIASTOLIC BLOOD PRESSURE: 80 MMHG

## 2025-06-03 DIAGNOSIS — M54.2 CERVICALGIA: ICD-10-CM

## 2025-06-03 DIAGNOSIS — M54.12 CERVICAL RADICULOPATHY: ICD-10-CM

## 2025-06-03 DIAGNOSIS — M50.90 CERVICAL DISC DISEASE: ICD-10-CM

## 2025-06-03 DIAGNOSIS — F41.8 SITUATIONAL ANXIETY: Primary | ICD-10-CM

## 2025-06-03 PROCEDURE — 99214 OFFICE O/P EST MOD 30 MIN: CPT | Mod: S$PBB,,, | Performed by: INTERNAL MEDICINE

## 2025-06-03 PROCEDURE — 99214 OFFICE O/P EST MOD 30 MIN: CPT | Mod: PBBFAC | Performed by: INTERNAL MEDICINE

## 2025-06-03 PROCEDURE — 99999 PR PBB SHADOW E&M-EST. PATIENT-LVL IV: CPT | Mod: PBBFAC,,, | Performed by: INTERNAL MEDICINE

## 2025-06-03 RX ORDER — HYDROXYZINE PAMOATE 25 MG/1
25 CAPSULE ORAL EVERY 8 HOURS PRN
Qty: 30 CAPSULE | Refills: 1 | Status: SHIPPED | OUTPATIENT
Start: 2025-06-03

## 2025-06-13 ENCOUNTER — HOSPITAL ENCOUNTER (OUTPATIENT)
Dept: RADIOLOGY | Facility: HOSPITAL | Age: 70
Discharge: HOME | End: 2025-06-13
Attending: INTERNAL MEDICINE
Payer: MEDICARE

## 2025-06-13 DIAGNOSIS — M54.12 CERVICAL RADICULOPATHY: ICD-10-CM

## 2025-06-13 DIAGNOSIS — M54.2 CERVICALGIA: ICD-10-CM

## 2025-06-13 PROCEDURE — 72141 MRI NECK SPINE W/O DYE: CPT | Mod: 26,,, | Performed by: RADIOLOGY

## 2025-06-13 PROCEDURE — 72141 MRI NECK SPINE W/O DYE: CPT | Mod: TC

## 2025-06-17 ENCOUNTER — PATIENT MESSAGE (OUTPATIENT)
Dept: PRIMARY CARE CLINIC | Facility: CLINIC | Age: 70
End: 2025-06-17
Payer: MEDICARE

## 2025-06-17 DIAGNOSIS — M54.12 CERVICAL RADICULOPATHY: Primary | ICD-10-CM

## 2025-06-18 ENCOUNTER — RESULTS FOLLOW-UP (OUTPATIENT)
Dept: PRIMARY CARE CLINIC | Facility: CLINIC | Age: 70
End: 2025-06-18

## 2025-07-02 ENCOUNTER — OFFICE VISIT (OUTPATIENT)
Dept: NEUROSURGERY | Facility: CLINIC | Age: 70
End: 2025-07-02
Payer: MEDICARE

## 2025-07-02 ENCOUNTER — PATIENT MESSAGE (OUTPATIENT)
Dept: ADMINISTRATIVE | Facility: HOSPITAL | Age: 70
End: 2025-07-02
Payer: MEDICARE

## 2025-07-02 ENCOUNTER — HOSPITAL ENCOUNTER (OUTPATIENT)
Dept: RADIOLOGY | Facility: HOSPITAL | Age: 70
Discharge: HOME | End: 2025-07-02
Attending: NURSE PRACTITIONER
Payer: MEDICARE

## 2025-07-02 VITALS
HEART RATE: 60 BPM | WEIGHT: 189.63 LBS | BODY MASS INDEX: 27.2 KG/M2 | SYSTOLIC BLOOD PRESSURE: 144 MMHG | TEMPERATURE: 97 F | DIASTOLIC BLOOD PRESSURE: 91 MMHG

## 2025-07-02 DIAGNOSIS — M54.12 CERVICAL RADICULOPATHY: ICD-10-CM

## 2025-07-02 DIAGNOSIS — M50.90 CERVICAL DISC DISEASE: Primary | ICD-10-CM

## 2025-07-02 DIAGNOSIS — R20.0 NUMBNESS AND TINGLING OF LOWER EXTREMITY: ICD-10-CM

## 2025-07-02 DIAGNOSIS — R20.2 NUMBNESS AND TINGLING OF LOWER EXTREMITY: ICD-10-CM

## 2025-07-02 DIAGNOSIS — M54.2 NECK PAIN: ICD-10-CM

## 2025-07-02 DIAGNOSIS — M50.90 CERVICAL DISC DISEASE: ICD-10-CM

## 2025-07-02 PROCEDURE — 99999 PR PBB SHADOW E&M-EST. PATIENT-LVL V: CPT | Mod: PBBFAC,,, | Performed by: NURSE PRACTITIONER

## 2025-07-02 PROCEDURE — 99215 OFFICE O/P EST HI 40 MIN: CPT | Mod: PBBFAC | Performed by: NURSE PRACTITIONER

## 2025-07-02 NOTE — PROGRESS NOTES
Neurosurgery  History & Physical    SUBJECTIVE:     History of Present Illness: Erik Rodriguez is a 70 y.o. male presents today as a referral from his PCP to discuss concerns with worsening neck and arm pain associated with spasms and tingling. States that he has struggled with more mild symptoms in 2003 that then progressed in 2019. He obtained relief with PT and HEP. Unfortunately, the pain has worsened. Denies trauma or inciting event. Describes the pain as aching and stiffness down the neck in to the arms.  Aggravating factors include movement. Alleviating factors include rest, Ibuprofen, traction, and Tylenol. Denies weakness, b/b dysfunction, saddle anesthesia, or gait instability.       Review of patient's allergies indicates:  No Known Allergies    Current Medications[1]    Past Medical History:   Diagnosis Date    Allergy     Fuchs' corneal dystrophy of both eyes     Hyperlipidemia     Stroke     TIA (transient ischemic attack) 07/2010     Past Surgical History:   Procedure Laterality Date    INJECTION OF STEROID  01/2020    pat reported    KNEE ARTHROSCOPY Left 01/2013    KNEE CARTILAGE SURGERY      left knee 09/2008; right knee 01/2015    NASAL SEPTUM SURGERY  02/2016    SKIN BIOPSY  02/2021    from head-benign-pt reported    STEROID INJECTION HIP Left 02/22/2022    pt reported    TOTAL HIP ARTHROPLASTY Right 04/2013    pat reported / Done in [place]    TOTAL HIP ARTHROPLASTY Left 05/29/2023    TOTAL KNEE ARTHROPLASTY Right 09/11/2023     Family History       Problem Relation (Age of Onset)    Arthritis Paternal Grandfather    Cancer Father, Maternal Grandmother, Paternal Grandmother    Heart disease Paternal Grandfather    Heart failure Mother    Kidney failure Mother    Stroke Maternal Grandfather          Social History     Socioeconomic History    Marital status:    Tobacco Use    Smoking status: Never    Smokeless tobacco: Never   Substance and Sexual Activity    Alcohol use: Yes     Comment:  occasionally    Drug use: Never    Sexual activity: Yes       Review of Systems    OBJECTIVE:     Vital Signs  Temp: 97.2 °F (36.2 °C)  Pulse: 60  BP: (!) 144/91  Pain Score: 0-No pain  Weight: 86 kg (189 lb 9.5 oz)  Body mass index is 27.2 kg/m².      Neurosurgery Physical Exam  General: well developed, well nourished, no distress.   Head: normocephalic, atraumatic  Neurologic: Alert and oriented. Thought content appropriate.  GCS: Motor: 6/Verbal: 5/Eyes: 4 GCS Total: 15  Mental Status: Awake, Alert, Oriented x 4  Language: No aphasia  Speech: No dysarthria  Cranial nerves: face symmetric, tongue midline, CN II-XII grossly intact.   Eyes: pupils equal, round, reactive to light with accomodation, EOMI.   Pulmonary: normal respirations, no signs of respiratory distress  Abdomen: soft, non-distended  Skin: Skin is warm, dry and intact.  Sensory: intact to light touch throughout  Motor Strength:Moves all extremities spontaneously with good tone.      Diagnostic Results:  I have personally reviewed the MRI cervical spine dated 6/13/25 which shows multilevel degenerative changes most pronounced C4-7 with moderate to severe stenosis. Cervical spinal cord demonstrates normal signal intensity.     ASSESSMENT/PLAN:     Erik Rodriguez is a 70 y.o. male presents today as a referral from his PCP to discuss concerns with worsening cervical radiculopathy. The patient is apprehensive about surgery given his wife's medical condition. He is eager to restart PT and consider injections. I have placed referrals to both. We reviewed red flag symptoms that would warrant a re-evaluation. He verbalized understanding. I would like the patient to follow-up in clinic as needed. I have encouraged him to contact the clinic with any questions, concerns, or adverse clinical changes. He verbalized understanding.        TAMEKA Shrestha-SHREE  Neurosurgery  [facility].        Note dictated with voice recognition software, please  excuse any grammatical errors.           [1]   Current Outpatient Medications   Medication Sig Dispense Refill    acetaminophen (TYLENOL) 500 MG tablet Take 500 mg by mouth as needed for Pain.      atorvastatin (LIPITOR) 20 MG tablet Take 1 tablet (20 mg total) by mouth every evening. 90 tablet 3    azelastine (ASTELIN) 137 mcg (0.1 %) nasal spray 1 spray (137 mcg total) by Nasal route 2 (two) times daily. 30 mL 0    clopidogreL (PLAVIX) 75 mg tablet Take 1 tablet (75 mg total) by mouth once daily. 90 tablet 3    EPINEPHrine (EPIPEN) 0.3 mg/0.3 mL AtIn Inject into the muscle once.      fluticasone propionate (FLONASE) 50 mcg/actuation nasal spray 1 spray (50 mcg total) by Each Nostril route 2 (two) times daily as needed for Rhinitis or Allergies. 48 g 3    hydrOXYzine pamoate (VISTARIL) 25 MG Cap Take 1 capsule (25 mg total) by mouth every 8 (eight) hours as needed. 30 capsule 1    ibuprofen (ADVIL,MOTRIN) 200 MG tablet Take 200 mg by mouth 2 (two) times daily as needed.       levocetirizine (XYZAL) 5 MG tablet Take 2.5 mg by mouth every evening.      loratadine (CLARITIN) 10 mg tablet Take 1 tablet (10 mg total) by mouth once daily. 30 tablet 1    montelukast (SINGULAIR) 10 mg tablet Take 1 tablet (10 mg total) by mouth every evening. 90 tablet 3    multivitamin capsule Take 1 capsule by mouth once daily.      pregabalin (LYRICA) 75 MG capsule Take 75 mg by mouth 2 (two) times daily.      psyllium (METAMUCIL) powder Take 1 packet by mouth as needed.       No current facility-administered medications for this visit.

## 2025-07-03 DIAGNOSIS — Z12.11 SCREENING FOR COLON CANCER: ICD-10-CM

## 2025-07-09 ENCOUNTER — CLINICAL SUPPORT (OUTPATIENT)
Dept: REHABILITATION | Facility: HOSPITAL | Age: 70
End: 2025-07-09
Payer: MEDICARE

## 2025-07-09 DIAGNOSIS — M54.12 CERVICAL RADICULOPATHY: Primary | ICD-10-CM

## 2025-07-09 PROCEDURE — 97140 MANUAL THERAPY 1/> REGIONS: CPT | Mod: PO

## 2025-07-09 PROCEDURE — 97161 PT EVAL LOW COMPLEX 20 MIN: CPT | Mod: PO

## 2025-07-09 NOTE — PROGRESS NOTES
Outpatient Rehab    Physical Therapy Evaluation    Patient Name: Erik Rodriguez  MRN: [id number]  YOB: 1955  Encounter Date: 7/9/2025    Therapy Diagnosis:   Encounter Diagnosis   Name Primary?    Cervical radiculopathy Yes     Physician: Jenae Krishnamurthy NP    Physician Orders: Eval and Treat  Medical Diagnosis: Cervical radiculopathy  Cervical disc disease  Surgical Diagnosis: Not applicable for this Episode   Surgical Date: Not applicable for this Episode  Days Since Last Surgery: Not applicable for this Episode    Visit # / Visits Authorized:  1 / 1  Insurance Authorization Period: 7/2/2025 to 7/2/2026  Date of Evaluation: 7/9/2025  Plan of Care Certification: 7/9/2025 to 9/9/2025     Time In: 1435   Time Out: 1540  Total Time (in minutes): 65   Total Billable Time (in minutes): 65    Intake Outcome Measure for FOTO Survey    Therapist reviewed FOTO scores for Erik Rodriguez on 7/9/2025.   FOTO report - see Media section or FOTO account episode details.     Intake Score: 59%    Precautions:       Subjective   History of Present Illness  Erik is a 70 y.o. male who reports to physical therapy with a chief concern of neck pain.     The patient reports a medical diagnosis of Cervical radiculopathy.    Diagnostic tests related to this condition: MRI studies and X-ray.   MRI Studies Details: 1. Cervicothoracic spondylosis as detailed above, progressed when compared to MRI dated 02/25/2019.  Moderate to severe spinal canal stenosis at C4-C7.  Multilevel high-grade neural foraminal narrowing.  X-Ray Details: Advanced degenerative changes of the mid-lower cervical spine.    Dominant Hand: Right  History of Present Condition/Illness: Pt reported similar Sx in 2003 & 2019. He reports a Hx of good results with Physical Therapy including traction and dry needling has helped in the past.  He c/o L lateral neck pain and intermittent B UE numbness into his hands.    Activities of Daily Living  Previously  independent with activities of daily living? Yes     Currently independent with activities of daily living? Yes              Pain     Patient reports a current pain level of 5/10. Pain at best is reported as 5/10. Pain at worst is reported as 5/10.   Location: L side of his neck  Clinical Progression (since onset): Stable  Pain Qualities: Dull  Pain-Relieving Factors: Heat, Medications - over-the-counter  Pain-Aggravating Factors: Head movements         Living Arrangements  Living Situation  Living Arrangements: Spouse/significant other    Home Setup  Home Access: Stairs without rails  Entrance Stairs - Number of Steps: 1  Number of Levels in Home: One level        Employment  Employment Status: Retired          Past Medical History/Physical Systems Review:   Erik Rodriguez  has a past medical history of Allergy, Fuchs' corneal dystrophy of both eyes, Hyperlipidemia, Stroke, and TIA (transient ischemic attack).    Erik Rodriguez  has a past surgical history that includes Knee cartilage surgery; Nasal septum surgery (02/2016); Knee arthroscopy (Left, 01/2013); Injection of steroid (01/2020); Total hip arthroplasty (Right, 04/2013); Skin biopsy (02/2021); Steroid injection hip (Left, 02/22/2022); Total hip arthroplasty (Left, 05/29/2023); and Total knee arthroplasty (Right, 09/11/2023).    Erik has a current medication list which includes the following prescription(s): acetaminophen, atorvastatin, azelastine, clopidogrel, epinephrine, fluticasone propionate, hydroxyzine pamoate, ibuprofen, levocetirizine, loratadine, montelukast, multivitamin, pregabalin, and psyllium.    Review of patient's allergies indicates:  No Known Allergies     Objective      Cervical Thoracic Sensation  Right Cervical/Thoracic Sensation  Intact: Light Touch       Left Cervical/Thoracic Sensation  Intact: Light Touch                Subcranial Range of Motion   Active Restricted? Passive Restricted? Pain   Flexion         Protraction          Retraction           Cervical Range of Motion   Active (deg) Passive (deg) Pain   Flexion 36       Extension 25       Right Lateral Flexion 15       Right Rotation         Left Lateral Flexion 17       Left Rotation           Cx spine rotation R = 50%, L = 40%    Shoulder Range of Motion  Left Shoulder   Active (deg) Passive (deg) Pain   Flexion   154     Extension         Scaption         ABduction   120     ADduction         Horizontal ABduction         Horizontal ADduction         External Rotation (Shoulder ABducted 0 degrees)         External Rotation (Shoulder ABducted 45 degrees)         External Rotation (Shoulder ABducted 90 degrees)   70     Internal Rotation (Shoulder ABducted 0 degrees)         Internal Rotation (Shoulder ABducted 45 degrees)         Internal Rotation (Shoulder ABducted 90 degrees)   20         Shoulder, Elbow, or Forearm Range of Motion Details: R shoulder PROM is WFL.         Shoulder Strength - Planes of Motion   Right Strength Right Pain Left Strength Left  Pain   Flexion 4+   4+     Extension           ABduction 4+   4  (uncomfortable)   ADduction           Horizontal ABduction           Horizontal ADduction           Internal Rotation 0° 4+   4+     Internal Rotation 90°           External Rotation 0° 4+   4+     External Rotation 90°               Elbow Strength   Right Strength Right Pain Left Strength Left  Pain   Flexion (C6) 5   5     Extension (C7) 4   4            Wrist Strength - Planes of Motion   Right Strength Right Pain Left Strength Left  Pain   Flexion 5   5     Extension 3+   3+     Radial Deviation           Ulnar Deviation (C8)                         Cervical Screen  Tests  Positive: Right Distraction and Left Distraction  Negative: Right Spurling's A and Left Spurling's A  Cervical Range of Motion           Thoracic Range of Motion             Cervical/Thoracic Special Tests            Cervical Tests  Positive: Right Distraction and Left Distraction  Negative:  Right Alar Ligament, Left Alar Ligament, Right Cervical Compression, Left Cervical Compression, Right Spurling's A, and Left Spurling's A                  Treatment:  Manual Therapy  MT 1: Manual Cx traction      Time Entry(in minutes):  PT Evaluation (Low) Time Entry: 45  Manual Therapy Time Entry: 20    Assessment & Plan   Assessment  Erik presents with a condition of Low complexity.   Presentation of Symptoms: Stable       Functional Limitations: Activity tolerance  Impairments: Activity intolerance, Abnormal or restricted range of motion, Lack of appropriate home exercise program, Pain with functional activity    Prognosis: Good  Assessment Details: Patient demonstrates deficits with range of motion, strength, and function that limit ability to participate in school, work, and recreational activities. They would benefit from skilled PT services to normalize kinetic chain mobility, strength, and function to safely return to their prior level of activity.    Plan  From a physical therapy perspective, the patient would benefit from: Skilled Rehab Services    Planned therapy interventions include: Therapeutic exercise, Therapeutic activities, Neuromuscular re-education, Manual therapy, and Other (Comment). Dry Needling (prn)  Planned modalities to include: Electrical stimulation - attended, Electrical stimulation - passive/unattended, Thermotherapy (hot pack), and Cryotherapy (cold pack).        Visit Frequency: 2 times Per Week for 8 Weeks.       This plan was discussed with Patient.   Discussion participants: Agreed Upon Plan of Care  Plan details: Frequency and duration of treatment to be adjusted as needed          The patient's spiritual, cultural, and educational needs were considered, and the patient is agreeable to the plan of care and goals.           Goals:   Active       Long term goals       Pt will be independent in a HEP to assist in managing their Cx spine Sx.         Start:  07/09/25             Improve Cx spine ROM by 10 degrees into flexion, extension and B side bending       Start:  07/09/25            Improve Cx spine ROM into B rotation by 20%       Start:  07/09/25            Pt will report decreased pain to </= 1/10 at worst       Start:  07/09/25            Pt will be compliant with his home exercise program.       Start:  07/09/25            Pt will report improved function through an improved score on the FOTO neck survey        Start:  07/09/25               Short term goals       Pt will be instructed in an exercise program to address functional deficits related to          Start:  07/09/25            Improve Cx spine ROM by 5 degrees into flexion, extension and B side bending       Start:  07/09/25            Improve Cx spine ROM into B rotation by 10%       Start:  07/09/25            Pt will report decreased pain to </= 3/10 at worst       Start:  07/09/25            Pt will be compliant with his physical therapy appointments       Start:  07/09/25                Sammy Calvin, PT

## 2025-07-15 ENCOUNTER — CLINICAL SUPPORT (OUTPATIENT)
Dept: REHABILITATION | Facility: HOSPITAL | Age: 70
End: 2025-07-15
Payer: MEDICARE

## 2025-07-15 DIAGNOSIS — M54.12 CERVICAL RADICULOPATHY: Primary | ICD-10-CM

## 2025-07-15 PROCEDURE — 97112 NEUROMUSCULAR REEDUCATION: CPT

## 2025-07-15 PROCEDURE — 97110 THERAPEUTIC EXERCISES: CPT

## 2025-07-15 NOTE — PROGRESS NOTES
Outpatient Rehab    Physical Therapy Visit    Patient Name: Erik Rodriguez  MRN: [id number]  YOB: 1955  Encounter Date: 7/15/2025    Therapy Diagnosis:   Encounter Diagnosis   Name Primary?    Cervical radiculopathy Yes     Physician: Jenae Krishnamurthy NP    Physician Orders: Eval and Treat  Medical Diagnosis: Cervical radiculopathy  Cervical disc disease  Surgical Diagnosis: Not applicable for this Episode   Surgical Date: Not applicable for this Episode  Days Since Last Surgery: Not applicable for this Episode    Visit # / Visits Authorized:  2 / 20  Insurance Authorization Period: 7/12/2025 to 12/31/2025  Date of Evaluation: 7/9/2025  Plan of Care Certification: 7/9/2025 to 9/9/2025      PT/PTA: PT   Number of PTA visits since last PT visit:   Time In: 0900   Time Out: 0955  Total Time (in minutes): 55   Total Billable Time (in minutes): 55 (25 minutes 1:1)    FOTO:  Intake Score: 59%  Survey Score 2: Not applicable for this Episode%  Survey Score 3: Not applicable for this Episode%    Precautions:         Subjective   Patient reports transferring from [facility] location due to [place] being closer to home. Notes he has not been given an HEP yet. Neck feels stiff today..  Pain reported as 2/10.      Objective            Treatment:   Therapeutic Exercise: 25 minutes     Sidelying open books with hand behind head 10x 5 sec hold  Seated no money green thera band 2x10 3 sec hold  Seated horizontal abduction yellow thera band 2x10 3 sec hold  Seated thoracic extension 20x 5 sec hold     Manual Therapy: 0 minutes     Therapeutic Activities: 0 minutes     Balance/Neuromuscular Re-education: 25 minutes      Supine chin tuck 10x with folded pillow  Supine chin tuck 10x with flat pillow  Supine chin tuck with shoulder flexion 10x both sides  Back on wall D2 flexion yellow thera band 2x10 both sides    Assessment & Plan   Assessment: Patient presents for initial follow up. Patient given a home exercise program  today to address deficits found as this was not established in initial evaluation. Patient has greatest deficit with left neck rotation.  Evaluation/Treatment Tolerance: Patient tolerated treatment well    The patient will continue to benefit from skilled outpatient physical therapy in order to address the deficits listed in the problem list on the initial evaluation, provide patient and family education, and maximize the patients level of independence in the home and community environments.     The patient's spiritual, cultural, and educational needs were considered, and the patient is agreeable to the plan of care and goals.           Plan: continue per initial plan of care    Goals:   Active       Long term goals       Pt will be independent in a HEP to assist in managing their Cx spine Sx.   (Progressing)       Start:  07/09/25            Improve Cx spine ROM by 10 degrees into flexion, extension and B side bending (Progressing)       Start:  07/09/25            Improve Cx spine ROM into B rotation by 20% (Progressing)       Start:  07/09/25            Pt will report decreased pain to </= 1/10 at worst (Progressing)       Start:  07/09/25            Pt will be compliant with his home exercise program. (Progressing)       Start:  07/09/25            Pt will report improved function through an improved score on the FOTO neck survey  (Progressing)       Start:  07/09/25               Short term goals       Pt will be instructed in an exercise program to address functional deficits related to    (Progressing)       Start:  07/09/25            Improve Cx spine ROM by 5 degrees into flexion, extension and B side bending (Progressing)       Start:  07/09/25            Improve Cx spine ROM into B rotation by 10% (Progressing)       Start:  07/09/25            Pt will report decreased pain to </= 3/10 at worst (Progressing)       Start:  07/09/25            Pt will be compliant with his physical therapy appointments  (Progressing)       Start:  07/09/25                Meliton Tobar PT

## 2025-07-18 ENCOUNTER — CLINICAL SUPPORT (OUTPATIENT)
Dept: REHABILITATION | Facility: HOSPITAL | Age: 70
End: 2025-07-18
Payer: MEDICARE

## 2025-07-18 DIAGNOSIS — R29.898 DECREASED RANGE OF MOTION OF NECK: ICD-10-CM

## 2025-07-18 DIAGNOSIS — M54.12 CERVICAL RADICULOPATHY: Primary | ICD-10-CM

## 2025-07-18 PROCEDURE — 97110 THERAPEUTIC EXERCISES: CPT

## 2025-07-18 PROCEDURE — 97112 NEUROMUSCULAR REEDUCATION: CPT

## 2025-07-18 NOTE — PROGRESS NOTES
Outpatient Rehab    Physical Therapy Visit    Patient Name: Erik Rodriguez  MRN: [id number]  YOB: 1955  Encounter Date: 7/18/2025    Therapy Diagnosis:   Encounter Diagnoses   Name Primary?    Cervical radiculopathy Yes    Decreased range of motion of neck        Physician: Jenae Krishnamurthy NP    Physician Orders: Eval and Treat  Medical Diagnosis: Cervical radiculopathy  Cervical disc disease  Surgical Diagnosis: Not applicable for this Episode   Surgical Date: Not applicable for this Episode  Days Since Last Surgery: Not applicable for this Episode    Visit # / Visits Authorized:  4 / 20  Insurance Authorization Period: 7/12/2025 to 12/31/2025  Date of Evaluation: 7/9/2025  Plan of Care Certification: 7/9/2025 to 9/9/2025      PT/PTA:     Number of PTA visits since last PT visit:   Time In: 1200   Time Out: 1255  Total Time (in minutes): 55   Total Billable Time (in minutes): 55    FOTO:  Intake Score: 59%  Survey Score 2: Not applicable for this Episode%  Survey Score 3: Not applicable for this Episode%    Precautions:         Subjective   Patient reports that his neck feels stiff today but no pain. Reports his tingling down arms remains intermittent..  Pain reported as 0/10.      Objective            Treatment:   Therapeutic Exercise: 25 minutes    Sidelying open books with hand behind head 10x 5 sec hold  Seated no money green thera band 2x10 3 sec hold  Seated horizontal abduction yellow thera band 2x10 3 sec hold  Seated thoracic extension 20x 5 sec hold    Manual Therapy: 0 minutes    Therapeutic Activities: 0 minutes    Balance/Neuromuscular Re-education: 30 minutes     Supine chin tuck 10x with folded pillow  Supine chin tuck 10x with flat pillow  Supine chin tuck with shoulder flexion 10x both sides  Back on wall D2 flexion yellow thera band 2x10 both sides    Assessment & Plan   Assessment: Patient presents with deficits in neck range of motion. Greatest deficit with left rotation observed.  Patient with improved motion following DNF training. Neck position modified during DNF training due to mobility restriction.  Evaluation/Treatment Tolerance: Patient tolerated treatment well    The patient will continue to benefit from skilled outpatient physical therapy in order to address the deficits listed in the problem list on the initial evaluation, provide patient and family education, and maximize the patients level of independence in the home and community environments.     The patient's spiritual, cultural, and educational needs were considered, and the patient is agreeable to the plan of care and goals.           Plan: continue per initial plan of care    Goals:   Active       Long term goals       Pt will be independent in a HEP to assist in managing their Cx spine Sx.   (Progressing)       Start:  07/09/25            Improve Cx spine ROM by 10 degrees into flexion, extension and B side bending (Progressing)       Start:  07/09/25            Improve Cx spine ROM into B rotation by 20% (Progressing)       Start:  07/09/25            Pt will report decreased pain to </= 1/10 at worst (Progressing)       Start:  07/09/25            Pt will be compliant with his home exercise program. (Progressing)       Start:  07/09/25            Pt will report improved function through an improved score on the FOTO neck survey  (Progressing)       Start:  07/09/25               Short term goals       Pt will be instructed in an exercise program to address functional deficits related to    (Progressing)       Start:  07/09/25            Improve Cx spine ROM by 5 degrees into flexion, extension and B side bending (Progressing)       Start:  07/09/25            Improve Cx spine ROM into B rotation by 10% (Progressing)       Start:  07/09/25            Pt will report decreased pain to </= 3/10 at worst (Progressing)       Start:  07/09/25            Pt will be compliant with his physical therapy appointments (Progressing)        Start:  07/09/25                  Meliton Tobar, PT

## 2025-07-22 ENCOUNTER — CLINICAL SUPPORT (OUTPATIENT)
Dept: REHABILITATION | Facility: HOSPITAL | Age: 70
End: 2025-07-22
Payer: MEDICARE

## 2025-07-22 DIAGNOSIS — M54.12 CERVICAL RADICULOPATHY: Primary | ICD-10-CM

## 2025-07-22 PROCEDURE — 97112 NEUROMUSCULAR REEDUCATION: CPT

## 2025-07-22 PROCEDURE — 97110 THERAPEUTIC EXERCISES: CPT

## 2025-07-22 NOTE — PROGRESS NOTES
Outpatient Rehab    Physical Therapy Visit    Patient Name: Erik Rodriguez  MRN: [id number]  YOB: 1955  Encounter Date: 7/22/2025    Therapy Diagnosis:   Encounter Diagnosis   Name Primary?    Cervical radiculopathy Yes       Physician: Jenae Krishnamurthy NP    Physician Orders: Eval and Treat  Medical Diagnosis: Cervical radiculopathy  Cervical disc disease  Surgical Diagnosis: Not applicable for this Episode   Surgical Date: Not applicable for this Episode  Days Since Last Surgery: Not applicable for this Episode    Visit # / Visits Authorized:  3 / 20  Insurance Authorization Period: 7/12/2025 to 12/31/2025  Date of Evaluation: 7/9/2025  Plan of Care Certification: 7/9/2025 to 9/9/2025      PT/PTA: PT   Number of PTA visits since last PT visit:   Time In: 0900   Time Out: 0955  Total Time (in minutes): 55   Total Billable Time (in minutes): 55    FOTO:  Intake Score: 59%  Survey Score 2: Not applicable for this Episode%  Survey Score 3: Not applicable for this Episode%    Precautions:         Subjective   Patient reports neck soreness Saturday. This is feeling better today. He notes still stiffness in neck when rotating. Overall his arm symptoms are improving and his sleep quality..  Pain reported as 0/10.      Objective            Treatment:   Therapeutic Exercise: 30 minutes    Sidelying open books with hand behind head 10x 5 sec hold both sides  Seated no money green thera band 2x10 3 sec hold  Seated horizontal abduction yellow thera band 2x10 3 sec hold  Seated thoracic extension 20x 5 sec hold  Standing upper trap shrug at cable column 10lbs 2x10 both sides    Manual Therapy: 0 minutes    Therapeutic Activities: 0 minutes    Balance/Neuromuscular Re-education: 25 minutes     Supine chin tuck with folded pillow and shoulder flexion 10x both sides  Supine chin tuck 10x with flat pillow  Supine chin tuck with shoulder flexion 10x both sides  Back on wall D2 flexion yellow thera band 2x10 both  sides    Assessment & Plan   Assessment: Patient with improved neural symptoms down upper extremity reports. Patient able to be progressed with DNF training in greater neck extended position offering good prognosis.  Evaluation/Treatment Tolerance: Patient tolerated treatment well    The patient will continue to benefit from skilled outpatient physical therapy in order to address the deficits listed in the problem list on the initial evaluation, provide patient and family education, and maximize the patients level of independence in the home and community environments.     The patient's spiritual, cultural, and educational needs were considered, and the patient is agreeable to the plan of care and goals.           Plan: continue per initial plan of care    Goals:   Active       Long term goals       Pt will be independent in a HEP to assist in managing their Cx spine Sx.   (Progressing)       Start:  07/09/25            Improve Cx spine ROM by 10 degrees into flexion, extension and B side bending (Progressing)       Start:  07/09/25            Improve Cx spine ROM into B rotation by 20% (Progressing)       Start:  07/09/25            Pt will report decreased pain to </= 1/10 at worst (Progressing)       Start:  07/09/25            Pt will be compliant with his home exercise program. (Progressing)       Start:  07/09/25            Pt will report improved function through an improved score on the FOTO neck survey  (Progressing)       Start:  07/09/25               Short term goals       Pt will be instructed in an exercise program to address functional deficits related to    (Progressing)       Start:  07/09/25            Improve Cx spine ROM by 5 degrees into flexion, extension and B side bending (Progressing)       Start:  07/09/25            Improve Cx spine ROM into B rotation by 10% (Progressing)       Start:  07/09/25            Pt will report decreased pain to </= 3/10 at worst (Progressing)       Start:   07/09/25            Pt will be compliant with his physical therapy appointments (Progressing)       Start:  07/09/25                  Meliton Tobar PT

## 2025-07-25 ENCOUNTER — CLINICAL SUPPORT (OUTPATIENT)
Dept: REHABILITATION | Facility: HOSPITAL | Age: 70
End: 2025-07-25
Payer: MEDICARE

## 2025-07-25 DIAGNOSIS — M54.12 CERVICAL RADICULOPATHY: Primary | ICD-10-CM

## 2025-07-25 PROCEDURE — 97110 THERAPEUTIC EXERCISES: CPT

## 2025-07-25 PROCEDURE — 97112 NEUROMUSCULAR REEDUCATION: CPT

## 2025-07-25 NOTE — PROGRESS NOTES
Outpatient Rehab    Physical Therapy Visit    Patient Name: Erik Rodriguez  MRN: [id number]  YOB: 1955  Encounter Date: 7/25/2025    Therapy Diagnosis:   Encounter Diagnosis   Name Primary?    Cervical radiculopathy Yes       Physician: Jenae Krishnamurthy NP    Physician Orders: Eval and Treat  Medical Diagnosis: Cervical radiculopathy  Cervical disc disease  Surgical Diagnosis: Not applicable for this Episode   Surgical Date: Not applicable for this Episode  Days Since Last Surgery: Not applicable for this Episode    Visit # / Visits Authorized:  4 / 20  Insurance Authorization Period: 7/12/2025 to 12/31/2025  Date of Evaluation: 7/9/2025  Plan of Care Certification: 7/9/2025 to 9/9/2025      PT/PTA: PT   Number of PTA visits since last PT visit:   Time In: 0800   Time Out: 0855  Total Time (in minutes): 55   Total Billable Time (in minutes): 55    FOTO:  Intake Score: 59%  Survey Score 2: Not applicable for this Episode%  Survey Score 3: Not applicable for this Episode%    Precautions:         Subjective   Patient reports having some increased irritation along upper back today. Notes irritabiltiy upon waking..  Pain reported as 3/10.      Objective            Treatment:   Therapeutic Exercise: 30 minutes    Sidelying open books with hand behind head 10x 5 sec hold both sides  Seated no money green thera band 2x10 3 sec hold  Seated horizontal abduction yellow thera band 2x10 3 sec hold  Seated thoracic extension with cane 10x 5 sec hold  Standing upper trap shrug at cable column 10lbs 2x10 both sides  UBE 3/3 lvl 3.0    Manual Therapy: 0 minutes    Therapeutic Activities: 0 minutes    Balance/Neuromuscular Re-education: 25 minutes     Supine chin tuck 10x with flat pillow  Supine chin tuck with shoulder flexion 10x both sides  Back on wall D2 flexion yellow thera band 2x10 both sides    Assessment & Plan   Assessment: Patient with some increased irritation along spine in upper thoracic. Session focused  on thoracic mobility and DNF strengthening. Patient with improved pain modulation at end of session and greater range of motion.  Evaluation/Treatment Tolerance: Patient tolerated treatment well    The patient will continue to benefit from skilled outpatient physical therapy in order to address the deficits listed in the problem list on the initial evaluation, provide patient and family education, and maximize the patients level of independence in the home and community environments.     The patient's spiritual, cultural, and educational needs were considered, and the patient is agreeable to the plan of care and goals.           Plan: continue per initial plan of care    Goals:   Active       Long term goals       Pt will be independent in a HEP to assist in managing their Cx spine Sx.   (Progressing)       Start:  07/09/25            Improve Cx spine ROM by 10 degrees into flexion, extension and B side bending (Progressing)       Start:  07/09/25            Improve Cx spine ROM into B rotation by 20% (Progressing)       Start:  07/09/25            Pt will report decreased pain to </= 1/10 at worst (Progressing)       Start:  07/09/25            Pt will be compliant with his home exercise program. (Progressing)       Start:  07/09/25            Pt will report improved function through an improved score on the FOTO neck survey  (Progressing)       Start:  07/09/25               Short term goals       Pt will be instructed in an exercise program to address functional deficits related to    (Progressing)       Start:  07/09/25            Improve Cx spine ROM by 5 degrees into flexion, extension and B side bending (Progressing)       Start:  07/09/25            Improve Cx spine ROM into B rotation by 10% (Progressing)       Start:  07/09/25            Pt will report decreased pain to </= 3/10 at worst (Progressing)       Start:  07/09/25            Pt will be compliant with his physical therapy appointments  (Progressing)       Start:  07/09/25                  Meliton Tobar PT

## 2025-07-27 PROBLEM — R29.898 DECREASED RANGE OF MOTION OF NECK: Status: ACTIVE | Noted: 2025-07-27

## 2025-07-28 ENCOUNTER — OFFICE VISIT (OUTPATIENT)
Dept: PAIN MEDICINE | Facility: CLINIC | Age: 70
End: 2025-07-28
Payer: MEDICARE

## 2025-07-28 VITALS
HEART RATE: 72 BPM | HEIGHT: 70 IN | SYSTOLIC BLOOD PRESSURE: 131 MMHG | BODY MASS INDEX: 27.49 KG/M2 | TEMPERATURE: 98 F | OXYGEN SATURATION: 97 % | DIASTOLIC BLOOD PRESSURE: 85 MMHG | WEIGHT: 192 LBS

## 2025-07-28 DIAGNOSIS — M47.812 CERVICAL SPONDYLOSIS: Primary | ICD-10-CM

## 2025-07-28 DIAGNOSIS — M50.90 CERVICAL DISC DISEASE: ICD-10-CM

## 2025-07-28 DIAGNOSIS — M50.30 DDD (DEGENERATIVE DISC DISEASE), CERVICAL: ICD-10-CM

## 2025-07-28 DIAGNOSIS — M54.12 CERVICAL RADICULOPATHY: ICD-10-CM

## 2025-07-28 DIAGNOSIS — M79.18 MYOFASCIAL PAIN SYNDROME: ICD-10-CM

## 2025-07-28 PROCEDURE — 99204 OFFICE O/P NEW MOD 45 MIN: CPT | Mod: S$PBB,,, | Performed by: STUDENT IN AN ORGANIZED HEALTH CARE EDUCATION/TRAINING PROGRAM

## 2025-07-28 PROCEDURE — 99214 OFFICE O/P EST MOD 30 MIN: CPT | Mod: PBBFAC | Performed by: STUDENT IN AN ORGANIZED HEALTH CARE EDUCATION/TRAINING PROGRAM

## 2025-07-28 PROCEDURE — G2211 COMPLEX E/M VISIT ADD ON: HCPCS | Mod: ,,, | Performed by: STUDENT IN AN ORGANIZED HEALTH CARE EDUCATION/TRAINING PROGRAM

## 2025-07-28 PROCEDURE — 99999 PR PBB SHADOW E&M-EST. PATIENT-LVL IV: CPT | Mod: PBBFAC,,, | Performed by: STUDENT IN AN ORGANIZED HEALTH CARE EDUCATION/TRAINING PROGRAM

## 2025-07-28 NOTE — PROGRESS NOTES
Chronic Pain - New Consult    Referring Physician: Self, Aaareferral    Chief Complaint:   Chief Complaint   Patient presents with    Neck Pain     Tingly pain and has gone into arm (both arms), thinks therapy has helped          SUBJECTIVE:  Erik Rodriguez presents to the clinic for the evaluation of neck pain and tingling down both arms. The pain started in 2003 following no inciting event and symptoms have been worsening.The pain is located in the neck area and radiates to the both hands/fingers.  The pain is described as dull and tingling and is rated as 5/10. The pain is rated with a score of  5/10 on the BEST day and a score of 8/10 on the WORST day.  Symptoms interfere with daily activity and sleeping. The pain is exacerbated by therapy, moving his neck.  The pain is mitigated by medications and physical therapy.     Patient denies urinary incontinence, bowel incontinence, and significant motor weakness. Patient admits to difficulty buttoning buttons or holding objects:  none  Patient admits to difficulty with balance and/or falls due to instability: none He reports in 2019, he had another exacerbation where therapy helped a lot with his pain. He reports PT (dynamic therapy) did U/S and dry needling which helped a great deal. He reports that he has plugged into physical therapy recently, and reports that it has been aggravating his pain quite a bit.  He also reports a history of left leg radicular/sciatica pain.  He also reports left hip nerve pain after a hip surgery.  He also had knee replacements with residual right leg pain.    Physical Therapy/Home Exercise: yes, currently in therapy.    Pain Disability Index Review:      7/28/2025     2:55 PM   Last 3 PDI Scores   Pain Disability Index (PDI) 17       Pain Medications:   - tylenol (helps)   - lyrica (stopped)   - motrin (helped)     report:  Reviewed and consistent with medication use as prescribed.    Pain Procedures:   None    Imaging:   MRI CERVICAL  SPINE WITHOUT CONTRAST     CLINICAL HISTORY:  Neck pain, chronic;.  Radiculopathy, cervical region     TECHNIQUE:  Multiplanar, multisequence MR images of the cervical spine were acquired without the administration of contrast.     COMPARISON:  MRI 02/25/2019.     FINDINGS:  Slight reversal of the normal cervical lordosis.  2-3 mm of retrolisthesis of C4 on C5.  Occipital condyles, C1 lateral masses and odontoid process are intact.  Increase low signal intensity tissue surrounding the odontoid process with mild mass effect on the ventral thecal sac, slightly increased when compared to the previous MRI.  Vertebral body heights are well maintained.  No acute fractures.  No marrow signal abnormality to suggest an infiltrative process.  Incidental note is made of a benign osseous hemangioma at the T4 vertebral body.     Moderate to severe degenerative disc space narrowing desiccation at C4-C7 with associated endplate edema/Modic 1 changes, findings that have progressed when compared to the previous MRI.  Mild-to-moderate degenerative disc space narrowing desiccation throughout the remaining visualized cervicothoracic spine.     Cervical spinal cord demonstrates normal signal intensity.  Visualized brainstem is normal.  Cerebellar tonsils are in their expected location.     Vertebral artery flow voids are present.  Visualized parotid and submandibular glands appear within normal limits.  Bilateral thyroid nodules, incompletely evaluated on this exam.  No cervical lymphadenopathy.  Prevertebral soft tissues appear within normal limits.  Paraspinal musculature demonstrates normal bulk and signal intensity.     C2-C3: Mild bilateral facet arthropathy.  Left uncovertebral joint spurring.  No spinal canal stenosis.  Mild left neural foraminal narrowing.     C3-C4: Posterior disc osteophyte complex partially effaces the ventral thecal sac.  Mild left and moderate right facet arthropathy.  Bilateral uncovertebral joint  spurring.  No spinal canal stenosis.  Moderate left and moderate to severe right neural foraminal narrowing.     C4-C5: 2-3 mm of retrolisthesis of C4 on C5.  Posterior disc osteophyte complex effaces the ventral thecal sac and abuts the ventral spinal cord.  Mild bilateral facet arthropathy.  Bilateral uncovertebral joint spurring and ligamentum flavum hypertrophy.  Moderate to severe spinal canal stenosis with effacement of the left lateral recess.  Severe left and moderate to severe right neural foraminal narrowing.     C5-C6: Posterior disc osteophyte complex effaces the ventral thecal sac and abuts the ventral spinal cord.  Bilateral uncovertebral joint spurring.  Moderate to severe spinal canal stenosis with effacement of the right lateral recess.  Moderate to severe left and severe right neural foraminal narrowing.     C6-C7: Posterior disc osteophyte complex effaces the ventral thecal sac and abuts the ventral spinal cord.  Bilateral uncovertebral joint spurring.  Moderate spinal canal stenosis.  Severe bilateral neural foraminal narrowing.     C7-T1: Left foraminal disc osteophyte complex.  Mild left and moderate right facet arthropathy.  No spinal canal stenosis.  Moderate left neural foraminal narrowing.     T1-T2: Circumferential disc bulge with a superimposed left foraminal/extraforaminal protrusion.  No spinal canal stenosis.  Severe left and moderate right neural foraminal narrowing.     Impression:     1. Cervicothoracic spondylosis as detailed above, progressed when compared to MRI dated 02/25/2019.  Moderate to severe spinal canal stenosis at C4-C7.  Multilevel high-grade neural foraminal narrowing.        Electronically signed by:Juan Sandoval MD  Date:                                            06/13/2025  Time:                                           08:12      XR CERVICAL SPINE AP LATERAL W/ FLEX EXT AND SWIMMERS FLEX EXT WITHOU     CLINICAL HISTORY:  Radiculopathy, cervical region      TECHNIQUE:  AP, lateral, lateral extension, lateral flexion, swimmer views neutral, swimmer views extension, swimmer views flexion     COMPARISON:  01/21/2019     FINDINGS:  The alignment of the cervical spine is normal.  The vertebral body heights are well maintained.     Severe disc space narrowing C4-5, C5-6 and moderate disc space narrowing C6-7.  Bridging anterior osteophyte C4 through C7.  No acute fracture, no osseous lesions.  Flexion and extension views demonstrate no translational abnormalities.  There is multilevel significant facet joint osseous hypertrophy worse on the right at C3-4.     The upper lung zones demonstrate no abnormalities.     Impression:     Advanced degenerative changes of the mid-lower cervical spine.        Electronically signed by:Purvi Mcmahan MD  Date:                                            07/02/2025  Time:                                           11:26    Past Medical History:   Diagnosis Date    Allergy     Fuchs' corneal dystrophy of both eyes     Hyperlipidemia     Stroke     TIA (transient ischemic attack) 07/2010     Past Surgical History:   Procedure Laterality Date    INJECTION OF STEROID  01/2020    pat reported    KNEE ARTHROSCOPY Left 01/2013    KNEE CARTILAGE SURGERY      left knee 09/2008; right knee 01/2015    NASAL SEPTUM SURGERY  02/2016    SKIN BIOPSY  02/2021    from head-benign-pt reported    STEROID INJECTION HIP Left 02/22/2022    pt reported    TOTAL HIP ARTHROPLASTY Right 04/2013    pat reported / Done in [place]    TOTAL HIP ARTHROPLASTY Left 05/29/2023    TOTAL KNEE ARTHROPLASTY Right 09/11/2023     Social History[1]  Family History   Problem Relation Name Age of Onset    Heart failure Mother      Kidney failure Mother      Cancer Father Win Rodriguez         lung, metastases    Cancer Maternal Grandmother Kandi Rodriguez         ovary, metastases to bladder    Stroke Maternal Grandfather Dioni Rouse     Cancer Paternal Grandmother Ines Rouse      Heart disease Paternal Grandfather Hoang Rodriguez     Arthritis Paternal Grandfather Hoang Rodriguez        Review of patient's allergies indicates:  No Known Allergies    Current Outpatient Medications   Medication Sig    acetaminophen (TYLENOL) 500 MG tablet Take 500 mg by mouth as needed for Pain.    atorvastatin (LIPITOR) 20 MG tablet Take 1 tablet (20 mg total) by mouth every evening.    clopidogreL (PLAVIX) 75 mg tablet Take 1 tablet (75 mg total) by mouth once daily.    EPINEPHrine (EPIPEN) 0.3 mg/0.3 mL AtIn Inject into the muscle once.    fluticasone propionate (FLONASE) 50 mcg/actuation nasal spray 1 spray (50 mcg total) by Each Nostril route 2 (two) times daily as needed for Rhinitis or Allergies.    hydrOXYzine pamoate (VISTARIL) 25 MG Cap Take 1 capsule (25 mg total) by mouth every 8 (eight) hours as needed.    ibuprofen (ADVIL,MOTRIN) 200 MG tablet Take 200 mg by mouth 2 (two) times daily as needed.     loratadine (CLARITIN) 10 mg tablet Take 1 tablet (10 mg total) by mouth once daily.    montelukast (SINGULAIR) 10 mg tablet Take 1 tablet (10 mg total) by mouth every evening.    multivitamin capsule Take 1 capsule by mouth once daily.    psyllium (METAMUCIL) powder Take 1 packet by mouth as needed.    azelastine (ASTELIN) 137 mcg (0.1 %) nasal spray 1 spray (137 mcg total) by Nasal route 2 (two) times daily. (Patient not taking: Reported on 7/28/2025)    levocetirizine (XYZAL) 5 MG tablet Take 2.5 mg by mouth every evening. (Patient not taking: Reported on 7/28/2025)    pregabalin (LYRICA) 75 MG capsule Take 75 mg by mouth 2 (two) times daily. (Patient not taking: Reported on 7/28/2025)     No current facility-administered medications for this visit.       REVIEW OF SYSTEMS:    GENERAL:  current fevers or chills, recent use of antibiotics?: denies.  HEENT:  History of migraines/headaches?: denies, History of major throat surgery?: denies  RESPIRATORY:  History of home oxygen or pulmonary  "hypertension/severe breathing dysfunction?: denies; Smoking history?: denies  CARDIOVASCULAR:  Hx of palpitations/rhythm problems?: denies Hx of Heart Attacks/Surgery?: denies  GI:  Recent abdominal discomfort or recent change in bowel habits?: denies  MUSCULOSKELETAL:  See HPI.  SKIN:  unhealed wounds or rashes?: denies  PSYCH: psychiatric history or recent psychosocial stressors?: denies  HEMATOLOGY/LYMPHOLOGY:  Hx of prolonged bleeding, Hx of Blood thinner usage?: reports Plavix ; reason: prior TIA  NEURO:   history of seizures, strokes, chronic/old weakness (such as paralysis or paresis of any body part)?: reports prior TIA  All other reviewed and negative other than HPI.    OBJECTIVE:    /85 (BP Location: Right arm, Patient Position: Sitting)   Pulse 72   Temp 98.4 °F (36.9 °C) (Oral)   Ht 5' 10" (1.778 m)   Wt 87.1 kg (192 lb 0.3 oz)   SpO2 97%   BMI 27.55 kg/m²     PHYSICAL EXAMINATION:  General appearance: Well appearing, in no acute distress, alert and appropriately communicative.  Psych:  Mood and affect appropriate.  Skin: Skin color, texture, turgor normal, no rashes or lesions, in both upper and lower body for exposed skin.  Head/face:  Atraumatic, normocephalic.  Neck: No pain to palpation over the cervical paraspinous muscles. Spurling Negative. pain with neck flexion, extension, or lateral flexion. .  Cor: regular rate  Pulm: non-labored breathing  GI: Abdomen non-distended and non-tender.  Extremities: No deformities, significant lymphedema, or skin discoloration. No significant open wounds. No major amputations.  Musculoskeletal: Shoulder, hip, sacroiliac and knee provocative maneuvers are negative. Bilateral upper and lower extremity strength is normal and symmetric.  No atrophy or tone abnormalities are noted.  Neuro: Bilateral upper and lower extremity coordination and muscle stretch reflexes abnormalities noted: none.  Vickers and/or Clonus: negative; loss of sensation to light " touch: none  Gait: normal    CMP  Sodium   Date Value Ref Range Status   05/08/2025 140 136 - 145 mmol/L Final   05/03/2024 140 136 - 145 mmol/L Final     Potassium   Date Value Ref Range Status   05/08/2025 4.4 3.5 - 5.1 mmol/L Final   05/03/2024 4.0 3.5 - 5.1 mmol/L Final     Chloride   Date Value Ref Range Status   05/08/2025 105 95 - 110 mmol/L Final   05/03/2024 105 95 - 110 mmol/L Final     CO2   Date Value Ref Range Status   05/08/2025 28 23 - 29 mmol/L Final   05/03/2024 26 23 - 29 mmol/L Final     Glucose   Date Value Ref Range Status   05/08/2025 95 70 - 110 mg/dL Final   05/03/2024 90 70 - 110 mg/dL Final     BUN   Date Value Ref Range Status   05/08/2025 16 8 - 23 mg/dL Final     Creatinine   Date Value Ref Range Status   05/08/2025 0.9 0.5 - 1.4 mg/dL Final     Calcium   Date Value Ref Range Status   05/08/2025 9.0 8.7 - 10.5 mg/dL Final   05/03/2024 9.6 8.7 - 10.5 mg/dL Final     Protein Total   Date Value Ref Range Status   05/08/2025 7.1 6.0 - 8.4 gm/dL Final     Total Protein   Date Value Ref Range Status   05/03/2024 7.2 6.0 - 8.4 g/dL Final     Albumin   Date Value Ref Range Status   05/08/2025 4.0 3.5 - 5.2 g/dL Final   05/03/2024 4.0 3.5 - 5.2 g/dL Final     Total Bilirubin   Date Value Ref Range Status   05/03/2024 1.0 0.1 - 1.0 mg/dL Final     Comment:     For infants and newborns, interpretation of results should be based  on gestational age, weight and in agreement with clinical  observations.    Premature Infant recommended reference ranges:  Up to 24 hours.............<8.0 mg/dL  Up to 48 hours............<12.0 mg/dL  3-5 days..................<15.0 mg/dL  6-29 days.................<15.0 mg/dL       Bilirubin Total   Date Value Ref Range Status   05/08/2025 0.7 0.1 - 1.0 mg/dL Final     Comment:     For infants and newborns, interpretation of results should be based   on gestational age, weight and in agreement with clinical   observations.    Premature Infant recommended reference ranges:    0-24 hours:  <8.0 mg/dL   24-48 hours: <12.0 mg/dL   3-5 days:    <15.0 mg/dL   6-29 days:   <15.0 mg/dL     Alkaline Phosphatase   Date Value Ref Range Status   05/03/2024 69 55 - 135 U/L Final     ALP   Date Value Ref Range Status   05/08/2025 69 40 - 150 unit/L Final     AST   Date Value Ref Range Status   05/08/2025 24 11 - 45 unit/L Final   05/03/2024 28 10 - 40 U/L Final     ALT   Date Value Ref Range Status   05/08/2025 25 10 - 44 unit/L Final   05/03/2024 27 10 - 44 U/L Final     Anion Gap   Date Value Ref Range Status   05/08/2025 7 (L) 8 - 16 mmol/L Final     eGFR   Date Value Ref Range Status   05/08/2025 >60 >60 mL/min/1.73/m2 Final     Comment:     Estimated GFR calculated using the CKD-EPI creatinine (2021) equation.   05/03/2024 >60.0 >60 mL/min/1.73 m^2 Final         ASSESSMENT: 70 y.o. year old male with chronic pain, consistent with:    1. Cervical spondylosis  Ambulatory Referral/Consult to Physical Therapy      2. DDD (degenerative disc disease), cervical  Ambulatory Referral/Consult to Physical Therapy      3. Myofascial pain syndrome            IMPRESSION: Erik Rodriguez presents today for neck pain, with occasional pain radiating down bilateral arms to the hands.  History and physical exam are consistent with cervical radiculopathy and cervical facetogenic pain.  My independent interpretation of the imaging is consistent with cervical degenerative disc disease with central and foraminal stenosis at multiple levels and cervical facet arthropathy.  He has had good benefit of neck pain with prior sessions of physical therapy. We will continue with conservative management for now.    PLAN:   - I have stressed the importance of physical activity and a home exercise plan to help with pain and improve health.  - Patient can continue with medications for now since they are providing benefits, using them appropriately, and without side effects.  - continue physical therapy for now  - we discussed the  option of B/L C4, C5, C6 MBB, but he should try the physical therapy first.  - RTC in 2 - 3 months to discuss advanced imaging and/or interventions, if indicated at that time  - Counseled patient regarding the importance of activity modification and physical therapy.    The above plan and management options were discussed at length with patient. Patient is in agreement with the above and verbalized understanding. It will be communicated with the referring physician via electronic record, fax, or mail.    Chloe Steele  07/28/2025         [1]   Social History  Socioeconomic History    Marital status:    Tobacco Use    Smoking status: Never    Smokeless tobacco: Never   Substance and Sexual Activity    Alcohol use: Yes     Comment: occasionally    Drug use: Never    Sexual activity: Yes

## 2025-07-29 ENCOUNTER — CLINICAL SUPPORT (OUTPATIENT)
Dept: REHABILITATION | Facility: HOSPITAL | Age: 70
End: 2025-07-29
Payer: MEDICARE

## 2025-07-29 DIAGNOSIS — M54.12 CERVICAL RADICULOPATHY: Primary | ICD-10-CM

## 2025-07-29 DIAGNOSIS — R29.898 DECREASED RANGE OF MOTION OF NECK: ICD-10-CM

## 2025-07-29 PROCEDURE — 97112 NEUROMUSCULAR REEDUCATION: CPT

## 2025-07-29 PROCEDURE — 97110 THERAPEUTIC EXERCISES: CPT

## 2025-07-29 NOTE — PROGRESS NOTES
Outpatient Rehab    Physical Therapy Visit    Patient Name: Erik Rodriguez  MRN: [id number]  YOB: 1955  Encounter Date: 7/29/2025    Therapy Diagnosis:   Encounter Diagnoses   Name Primary?    Cervical radiculopathy Yes    Decreased range of motion of neck        Physician: Jenae Krishnamurthy NP    Physician Orders: Eval and Treat  Medical Diagnosis: Cervical radiculopathy  Cervical disc disease  Surgical Diagnosis: Not applicable for this Episode   Surgical Date: Not applicable for this Episode  Days Since Last Surgery: Not applicable for this Episode    Visit # / Visits Authorized:  5 / 20  Insurance Authorization Period: 7/12/2025 to 12/31/2025  Date of Evaluation: 7/9/2025  Plan of Care Certification: 7/9/2025 to 9/9/2025      PT/PTA: PT   Number of PTA visits since last PT visit:   Time In: 0900   Time Out: 0945  Total Time (in minutes): 45   Total Billable Time (in minutes): 45    FOTO:  Intake Score: 59%  Survey Score 2: Not applicable for this Episode%  Survey Score 3: Not applicable for this Episode%    Precautions:         Subjective   Patient reports seeing pain management yesterday and discussed to continue PT management without any injection at this time. He is considering joining a gym in the future. No pain today. But notes stiffness in neck..  Pain reported as 0/10.      Objective            Treatment:   Therapeutic Exercise: 30 minutes    Sidelying open books with hand behind head 15x 5 sec hold both sides  Seated no money green thera band 2x10 3 sec hold  Seated horizontal abduction red thera band 2x10 3 sec hold  Seated thoracic extension with cane 10x 5 sec hold  Standing upper trap shrug at cable column 10lbs 2x10 both sides  UBE 3/3 lvl 3.0  Seated machine row 20lbs 2x10  Standing back on wall dumbbell curl 2x10 5lb    Manual Therapy: 0 minutes    Therapeutic Activities: 0 minutes    Balance/Neuromuscular Re-education: 15 minutes     Supine chin tuck 10x with flat pillow  Supine  chin tuck with shoulder flexion 10x both sides  Back on wall D2 flexion yellow thera band 2x10 both sides    Assessment & Plan   Assessment: Patient with improved irritability from previous session. Patient progressed with periscapular strengthening and DNF strengthening with good tolerance. Introduction to gym specific strength movements initiated today.   Evaluation/Treatment Tolerance: Patient tolerated treatment well    The patient will continue to benefit from skilled outpatient physical therapy in order to address the deficits listed in the problem list on the initial evaluation, provide patient and family education, and maximize the patients level of independence in the home and community environments.     The patient's spiritual, cultural, and educational needs were considered, and the patient is agreeable to the plan of care and goals.           Plan: continue per initial plan of care    Goals:   Active       Long term goals       Pt will be independent in a HEP to assist in managing their Cx spine Sx.   (Progressing)       Start:  07/09/25            Improve Cx spine ROM by 10 degrees into flexion, extension and B side bending (Progressing)       Start:  07/09/25            Improve Cx spine ROM into B rotation by 20% (Progressing)       Start:  07/09/25            Pt will report decreased pain to </= 1/10 at worst (Progressing)       Start:  07/09/25            Pt will be compliant with his home exercise program. (Progressing)       Start:  07/09/25            Pt will report improved function through an improved score on the FOTO neck survey  (Progressing)       Start:  07/09/25               Short term goals       Pt will be instructed in an exercise program to address functional deficits related to    (Progressing)       Start:  07/09/25            Improve Cx spine ROM by 5 degrees into flexion, extension and B side bending (Progressing)       Start:  07/09/25            Improve Cx spine ROM into B  rotation by 10% (Progressing)       Start:  07/09/25            Pt will report decreased pain to </= 3/10 at worst (Progressing)       Start:  07/09/25            Pt will be compliant with his physical therapy appointments (Progressing)       Start:  07/09/25              Meliton Tobar, PT

## 2025-08-01 ENCOUNTER — CLINICAL SUPPORT (OUTPATIENT)
Dept: REHABILITATION | Facility: HOSPITAL | Age: 70
End: 2025-08-01
Payer: MEDICARE

## 2025-08-01 DIAGNOSIS — R29.898 DECREASED RANGE OF MOTION OF NECK: ICD-10-CM

## 2025-08-01 DIAGNOSIS — M54.12 CERVICAL RADICULOPATHY: Primary | ICD-10-CM

## 2025-08-01 PROCEDURE — 97110 THERAPEUTIC EXERCISES: CPT | Mod: CQ

## 2025-08-01 PROCEDURE — 97112 NEUROMUSCULAR REEDUCATION: CPT | Mod: CQ

## 2025-08-01 NOTE — PROGRESS NOTES
Outpatient Rehab    Physical Therapy Visit    Patient Name: Erik Rodriguez  MRN: [id number]  YOB: 1955  Encounter Date: 8/1/2025    Therapy Diagnosis:   Encounter Diagnoses   Name Primary?    Cervical radiculopathy Yes    Decreased range of motion of neck      Physician: Jenae Krishnamurthy NP    Physician Orders: Eval and Treat  Medical Diagnosis: Cervical radiculopathy  Cervical disc disease  Surgical Diagnosis: Not applicable for this Episode   Surgical Date: Not applicable for this Episode  Days Since Last Surgery: Not applicable for this Episode    Visit # / Visits Authorized:  6 / 20  Insurance Authorization Period: 7/12/2025 to 12/31/2025  Date of Evaluation: 7/9/2025  Plan of Care Certification: 7/9/2025 to 9/9/2025      PT/PTA: PTA   Number of PTA visits since last PT visit:1  Time In: 0900   Time Out: 0950  Total Time (in minutes): 50   Total Billable Time (in minutes):   50     FOTO:  Intake Score (%): 59  Survey Score 2 (%): Not applicable for this Episode  Survey Score 3 (%): Not applicable for this Episode    Precautions:     Subjective   Patient reports increased mobility throughout cervical spine upon entering treatment session today. Patient denies any discomfort throughout cervical spine upon enter treatment session today, however, patient does report some discomfort throughout lumbar spine..  Pain reported as 2/10.      Objective    Objective Measures updated at progress report unless specified.      Treatment:   Therapeutic Exercise: 30 minutes     UBE 3/3 lvl 3.0  Sidelying open books with hand behind head 10x 5 sec hold both sides  Seated no money green thera band 2x10 3 sec hold  Seated horizontal abduction yellow thera band 2x10 3 sec hold  Seated thoracic extension with cane 10x 5 sec hold  Standing upper trap shrug at cable column 10lbs 2x10 both sides  Seated rower machine 20 lbs 2 x 10   Triceps pull downs 20 lbs 2 x 10   Wall slides with lift 2 x 10     Manual Therapy: 0  minutes     Therapeutic Activities: 0 minutes     Balance/Neuromuscular Re-education: 20 minutes      Supine chin tuck 10x with flat pillow  Supine chin tucks 10 x 5 second hold without pillow   Supine chin tuck with shoulder flexion 10x both sides  Back on wall D2 flexion yellow thera band 2x10 both sides    Assessment & Plan   Assessment:   Patient required verbal cues with PNF patterns due to patient without rotation of upper extremity throughout task, however, after verbal and tactile cues patient able to complete task properly. Patient informed Physical Therapist Assistant he felt as if shrugs were easier today than previous treatment. Patient able to tolerate add exercises without reports of increased or reproduced pain throughout or after Physical Therapy treatment. Patient required verbal cues with rower machine due to patient performing increased protraction of cervical spine, however, after verbal cues patient able to complete task properly.      The patient will continue to benefit from skilled outpatient physical therapy in order to address the deficits listed in the problem list on the initial evaluation, provide patient and family education, and maximize the patients level of independence in the home and community environments.     The patient's spiritual, cultural, and educational needs were considered, and the patient is agreeable to the plan of care and goals.       Plan:   Continue with Physical Therapist Plan of Care.     Goals:   Active       Long term goals       Pt will be independent in a HEP to assist in managing their Cx spine Sx.   (Progressing)       Start:  07/09/25            Improve Cx spine ROM by 10 degrees into flexion, extension and B side bending (Progressing)       Start:  07/09/25            Improve Cx spine ROM into B rotation by 20% (Progressing)       Start:  07/09/25            Pt will report decreased pain to </= 1/10 at worst (Progressing)       Start:  07/09/25             Pt will be compliant with his home exercise program. (Progressing)       Start:  07/09/25            Pt will report improved function through an improved score on the FOTO neck survey  (Progressing)       Start:  07/09/25               Short term goals       Pt will be instructed in an exercise program to address functional deficits related to    (Progressing)       Start:  07/09/25            Improve Cx spine ROM by 5 degrees into flexion, extension and B side bending (Progressing)       Start:  07/09/25            Improve Cx spine ROM into B rotation by 10% (Progressing)       Start:  07/09/25            Pt will report decreased pain to </= 3/10 at worst (Progressing)       Start:  07/09/25            Pt will be compliant with his physical therapy appointments (Progressing)       Start:  07/09/25                Montez Pagan, PTA

## 2025-08-05 ENCOUNTER — CLINICAL SUPPORT (OUTPATIENT)
Dept: REHABILITATION | Facility: HOSPITAL | Age: 70
End: 2025-08-05
Payer: MEDICARE

## 2025-08-05 DIAGNOSIS — M54.12 CERVICAL RADICULOPATHY: Primary | ICD-10-CM

## 2025-08-05 DIAGNOSIS — R29.898 DECREASED RANGE OF MOTION OF NECK: ICD-10-CM

## 2025-08-05 PROCEDURE — 97110 THERAPEUTIC EXERCISES: CPT

## 2025-08-05 PROCEDURE — 97112 NEUROMUSCULAR REEDUCATION: CPT

## 2025-08-05 NOTE — PROGRESS NOTES
Outpatient Rehab    Physical Therapy Visit    Patient Name: Erik Rodriguez  MRN: [id number]  YOB: 1955  Encounter Date: 8/5/2025    Therapy Diagnosis:   Encounter Diagnoses   Name Primary?    Cervical radiculopathy Yes    Decreased range of motion of neck      Physician: Jenae Krishnamurthy NP    Physician Orders: Eval and Treat  Medical Diagnosis: Cervical radiculopathy  Cervical disc disease  Surgical Diagnosis: Not applicable for this Episode   Surgical Date: Not applicable for this Episode  Days Since Last Surgery: Not applicable for this Episode    Visit # / Visits Authorized:  7 / 20  Insurance Authorization Period: 7/12/2025 to 12/31/2025  Date of Evaluation: 7/9/2025  Plan of Care Certification: 7/9/2025 to 9/9/2025      PT/PTA: PT   Number of PTA visits since last PT visit:   Time In: 0900   Time Out: 0955  Total Time (in minutes): 55   Total Billable Time (in minutes): 55 50     FOTO:  Intake Score (%): 59  Survey Score 2 (%): Not applicable for this Episode  Survey Score 3 (%): Not applicable for this Episode    Precautions:     Subjective   Patient reports his neck ROM is improving. No pain complaints today..  Pain reported as 0/10.      Objective    Objective Measures updated at progress report unless specified.      Treatment:   Therapeutic Exercise: 30 minutes     UBE 3/3 lvl 3.0  Sidelying open books with hand behind head 10x 5 sec hold both sides  Seated no money green thera band 2x10 3 sec hold  Seated thoracic extension with cane 10x 5 sec hold  Standing upper trap shrug at cable column 10lbs 2x10 both sides  Seated rower machine 30 lbs 2 x 10   Triceps pull downs 20 lbs 2 x 10   Wall slides with lift 2 x 10     Manual Therapy: 0 minutes     Therapeutic Activities: 0 minutes     Balance/Neuromuscular Re-education: 20 minutes      Supine chin tucks 10 x 10 second hold without pillow   Supine chin tuck with shoulder flexion 10x both sides 3lb dumbbell  Back on wall D2 flexion yellow thera  band 2x10 both sides    Assessment & Plan   Assessment: Patient with improved neck range of motion at end of session. Patient able to be progressed in DNF strengthening and periscapular strengthening.   Evaluation/Treatment Tolerance: Patient tolerated treatment well    The patient will continue to benefit from skilled outpatient physical therapy in order to address the deficits listed in the problem list on the initial evaluation, provide patient and family education, and maximize the patients level of independence in the home and community environments.     The patient's spiritual, cultural, and educational needs were considered, and the patient is agreeable to the plan of care and goals.       Plan:   Continue with Physical Therapist Plan of Care.     Goals:   Active       Long term goals       Pt will be independent in a HEP to assist in managing their Cx spine Sx.   (Progressing)       Start:  07/09/25            Improve Cx spine ROM by 10 degrees into flexion, extension and B side bending (Progressing)       Start:  07/09/25            Improve Cx spine ROM into B rotation by 20% (Progressing)       Start:  07/09/25            Pt will report decreased pain to </= 1/10 at worst (Progressing)       Start:  07/09/25            Pt will be compliant with his home exercise program. (Progressing)       Start:  07/09/25            Pt will report improved function through an improved score on the FOTO neck survey  (Progressing)       Start:  07/09/25               Short term goals       Pt will be instructed in an exercise program to address functional deficits related to    (Progressing)       Start:  07/09/25            Improve Cx spine ROM by 5 degrees into flexion, extension and B side bending (Progressing)       Start:  07/09/25            Improve Cx spine ROM into B rotation by 10% (Progressing)       Start:  07/09/25            Pt will report decreased pain to </= 3/10 at worst (Progressing)       Start:   07/09/25            Pt will be compliant with his physical therapy appointments (Progressing)       Start:  07/09/25                Meliton Tobar PT

## 2025-08-07 NOTE — PROGRESS NOTES
Outpatient Rehab    Physical Therapy Visit    Patient Name: Erik Rodriguez  MRN: [id number]  YOB: 1955  Encounter Date: 8/8/2025    Therapy Diagnosis:   Encounter Diagnoses   Name Primary?    Cervical radiculopathy Yes    Decreased range of motion of neck        Physician: Jenae Krishnamurthy NP    Physician Orders: Eval and Treat  Medical Diagnosis: Cervical radiculopathy  Cervical disc disease  Surgical Diagnosis: Not applicable for this Episode   Surgical Date: Not applicable for this Episode  Days Since Last Surgery: Not applicable for this Episode    Visit # / Visits Authorized:  8 / 20  Insurance Authorization Period: 7/12/2025 to 12/31/2025  Date of Evaluation: 7/9/2025  Plan of Care Certification: 7/9/2025 to 9/9/2025      PT/PTA: PTA   Number of PTA visits since last PT visit:1  Time In: 0900   Time Out: 0945  Total Time (in minutes): 45   Total Billable Time (in minutes):   45    FOTO:  Intake Score (%): 59  Survey Score 2 (%): Not applicable for this Episode  Survey Score 3 (%): Not applicable for this Episode    Precautions:     Subjective   Patient reports some continued discomfort throughout cervical spine, however, patient still reports improvements since attending Physical Therapy treatment..  Pain reported as 2/10.      Objective    Objective Measures updated at progress report unless specified.      Treatment:   Treatment:   Therapeutic Exercise: 30 minutes     UBE 3/3 lvl 3.0  Sidelying open books with hand behind head 10x 5 sec hold both sides  Seated no money green thera band 2x10 3 sec hold  Seated thoracic extension with cane 10x 5 sec hold  Standing upper trap shrug at cable column 10lbs 2x10 both sides  Seated rower machine 30 lbs 2 x 10   Triceps pull downs 20 lbs 2 x 10   Wall slides with lift 2 x 10      Manual Therapy: 0 minutes     Therapeutic Activities: 0 minutes     Balance/Neuromuscular Re-education: 20 minutes      Supine chin tucks 10 x 10 second hold without pillow    Supine chin tuck with shoulder flexion 10x both sides 3lb dumbbell  Back on wall D2 flexion yellow thera band 2x10 both sides    Assessment & Plan   Assessment:   Patient able to complete chin tucks today without pillow under head without increased or reproduced pain. Patient still continue to require rest breaks throughout session due to patient with muscle fatigue throughout session. Patient also required verbal and tactile cues throughout with supine shoulder flexion due to patient with internal rotation of upper extremity throughout task, however, after cues patient able to complete task properly.      The patient will continue to benefit from skilled outpatient physical therapy in order to address the deficits listed in the problem list on the initial evaluation, provide patient and family education, and maximize the patients level of independence in the home and community environments.     The patient's spiritual, cultural, and educational needs were considered, and the patient is agreeable to the plan of care and goals.       Plan:   Continue with Physical Therapist Plan of Care.     Goals:   Active       Long term goals       Pt will be independent in a HEP to assist in managing their Cx spine Sx.   (Progressing)       Start:  07/09/25            Improve Cx spine ROM by 10 degrees into flexion, extension and B side bending (Progressing)       Start:  07/09/25            Improve Cx spine ROM into B rotation by 20% (Progressing)       Start:  07/09/25            Pt will report decreased pain to </= 1/10 at worst (Progressing)       Start:  07/09/25            Pt will be compliant with his home exercise program. (Progressing)       Start:  07/09/25            Pt will report improved function through an improved score on the FOTO neck survey  (Progressing)       Start:  07/09/25               Short term goals       Pt will be instructed in an exercise program to address functional deficits related to     (Progressing)       Start:  07/09/25            Improve Cx spine ROM by 5 degrees into flexion, extension and B side bending (Progressing)       Start:  07/09/25            Improve Cx spine ROM into B rotation by 10% (Progressing)       Start:  07/09/25            Pt will report decreased pain to </= 3/10 at worst (Progressing)       Start:  07/09/25            Pt will be compliant with his physical therapy appointments (Progressing)       Start:  07/09/25              Montez Pagan, PTA

## 2025-08-08 ENCOUNTER — CLINICAL SUPPORT (OUTPATIENT)
Dept: REHABILITATION | Facility: HOSPITAL | Age: 70
End: 2025-08-08
Payer: MEDICARE

## 2025-08-08 DIAGNOSIS — M54.12 CERVICAL RADICULOPATHY: Primary | ICD-10-CM

## 2025-08-08 DIAGNOSIS — R29.898 DECREASED RANGE OF MOTION OF NECK: ICD-10-CM

## 2025-08-08 PROCEDURE — 97530 THERAPEUTIC ACTIVITIES: CPT | Mod: CQ

## 2025-08-08 PROCEDURE — 97112 NEUROMUSCULAR REEDUCATION: CPT | Mod: CQ

## 2025-08-12 ENCOUNTER — CLINICAL SUPPORT (OUTPATIENT)
Dept: REHABILITATION | Facility: HOSPITAL | Age: 70
End: 2025-08-12
Payer: MEDICARE

## 2025-08-12 DIAGNOSIS — M54.12 CERVICAL RADICULOPATHY: Primary | ICD-10-CM

## 2025-08-12 DIAGNOSIS — R29.898 DECREASED RANGE OF MOTION OF NECK: ICD-10-CM

## 2025-08-12 PROCEDURE — 97110 THERAPEUTIC EXERCISES: CPT

## 2025-08-12 PROCEDURE — 97112 NEUROMUSCULAR REEDUCATION: CPT

## 2025-08-15 ENCOUNTER — PATIENT OUTREACH (OUTPATIENT)
Dept: ADMINISTRATIVE | Facility: HOSPITAL | Age: 70
End: 2025-08-15
Payer: MEDICARE

## 2025-08-25 ENCOUNTER — PATIENT OUTREACH (OUTPATIENT)
Dept: ADMINISTRATIVE | Facility: HOSPITAL | Age: 70
End: 2025-08-25
Payer: MEDICARE

## 2025-08-26 ENCOUNTER — LAB VISIT (OUTPATIENT)
Dept: LAB | Facility: HOSPITAL | Age: 70
End: 2025-08-26
Attending: INTERNAL MEDICINE
Payer: MEDICARE